# Patient Record
Sex: FEMALE | Race: WHITE | NOT HISPANIC OR LATINO
[De-identification: names, ages, dates, MRNs, and addresses within clinical notes are randomized per-mention and may not be internally consistent; named-entity substitution may affect disease eponyms.]

---

## 2021-12-29 PROBLEM — Z00.00 ENCOUNTER FOR PREVENTIVE HEALTH EXAMINATION: Status: ACTIVE | Noted: 2021-12-29

## 2022-01-21 ENCOUNTER — APPOINTMENT (OUTPATIENT)
Dept: GERIATRICS | Facility: CLINIC | Age: 87
End: 2022-01-21
Payer: MEDICARE

## 2022-01-21 ENCOUNTER — LABORATORY RESULT (OUTPATIENT)
Age: 87
End: 2022-01-21

## 2022-01-21 VITALS
HEART RATE: 94 BPM | WEIGHT: 106 LBS | DIASTOLIC BLOOD PRESSURE: 70 MMHG | SYSTOLIC BLOOD PRESSURE: 126 MMHG | OXYGEN SATURATION: 98 % | BODY MASS INDEX: 20.01 KG/M2 | TEMPERATURE: 97.8 F | HEIGHT: 61 IN

## 2022-01-21 DIAGNOSIS — R07.81 PLEURODYNIA: ICD-10-CM

## 2022-01-21 DIAGNOSIS — R00.0 TACHYCARDIA, UNSPECIFIED: ICD-10-CM

## 2022-01-21 PROCEDURE — 99205 OFFICE O/P NEW HI 60 MIN: CPT | Mod: 25

## 2022-01-21 PROCEDURE — 36415 COLL VENOUS BLD VENIPUNCTURE: CPT

## 2022-01-24 ENCOUNTER — TRANSCRIPTION ENCOUNTER (OUTPATIENT)
Age: 87
End: 2022-01-24

## 2022-01-24 NOTE — ASSESSMENT
[FreeTextEntry1] : to get records from her physicians\par goal is to build her home at [place]\par will continue with cardiology at [facility]\par memory testing in near future\par labs to look for reversible causes \par \par Son: Andrei Reynolds\par \par wrote letter with concerns to be read prior to visit\par \par weight loss\par depression\par anxiety

## 2022-01-24 NOTE — HISTORY OF PRESENT ILLNESS
[Cane] : cane [Carbon Monoxide Detector] : carbon monoxide detector [Any fall with injury in past year] : Patient reported fall with injury in the past year [Completely Independent] : Completely independent. [0] : 2) Feeling down, depressed, or hopeless: Not at all (0) [PHQ-2 Negative - No further assessment needed] : PHQ-2 Negative - No further assessment needed [Smoke Detector] : smoke detector [FreeTextEntry1] : PCP Dr Terry Fields - \par Now working with Dr. Lanette Lanier\par \par Had Laminectomy L2 L3 Aug 2021   11 days ago 2021 Heart Attack\par \par Prior to MI she was on a beta blocker\par Dr Aguilera - [facility]\par maternal grandmother with MI\par \par Sept fell and hit head \par saw neurologist Dr Eduardo Fields\par Brain and Neck MRI negative\par \par Dr Shah well known to patient\par has also noted changes in mentation/cognitive status\par \par Son presenting with mother\par concern over her memory, ability to care for finances\par especially worse after anesthesia of back surgery\par has seen a neurologist and had simple memory testing\par \par Son: Andrei Reynolds [Grab Bars] : no grab bars [Shower Chair] : no shower chair [[id number]] : 0

## 2022-01-24 NOTE — REVIEW OF SYSTEMS
[Fever] : no fever [Chills] : no chills [Eye Pain] : no eye pain [Red Eyes] : eyes not red [Nosebleeds] : no nosebleeds [Nasal Discharge] : no nasal discharge [Chest Pain] : no chest pain [Palpitations] : no palpitations [Cough] : no cough [SOB on Exertion] : no shortness of breath during exertion [Constipation] : no constipation [Diarrhea] : no diarrhea [Skin Lesions] : no skin lesions [Skin Wound] : no skin wound [Dizziness] : no dizziness [Fainting] : no fainting [Suicidal] : not suicidal [Sleep Disturbances] : no sleep disturbances

## 2022-01-24 NOTE — PHYSICAL EXAM
[Alert] : alert [Well Nourished] : well nourished [No Acute Distress] : in no acute distress [Well Developed] : well developed [Sclera] : the sclera and conjunctiva were normal [EOMI] : extraocular movements were intact [PERRL] : pupils were equal in size, round, and reactive to light [Normal Oral Mucosa] : normal oral mucosa [No Oral Pallor] : no oral pallor [No Respiratory Distress] : no respiratory distress [No Acc Muscle Use] : no accessory muscle use [Respiration, Rhythm And Depth] : normal respiratory rhythm and effort [Auscultation Breath Sounds / Voice Sounds] : lungs were clear to auscultation bilaterally [Normal S1, S2] : normal S1 and S2 [Heart Rate And Rhythm] : heart rate was normal and rhythm regular [Bowel Sounds] : normal bowel sounds [Abdomen Tenderness] : non-tender [Abdomen Soft] : soft [Cervical Lymph Nodes Enlarged Posterior Bilaterally] : posterior cervical [Cervical Lymph Nodes Enlarged Anterior Bilaterally] : anterior cervical, supraclavicular [No CVA Tenderness] : no CVA  tenderness [No Spinal Tenderness] : no spinal tenderness [Normal Color / Pigmentation] : normal skin color and pigmentation [] : no rash [Normal Turgor] : normal skin turgor [Sensation] : the sensory exam was normal to light touch and pinprick [No Focal Deficits] : no focal deficits [Motor Exam] : the motor exam was normal [Normal Affect] : the affect was normal [Normal Mood] : the mood was normal

## 2022-01-24 NOTE — HISTORY OF PRESENT ILLNESS
[Cane] : cane [Carbon Monoxide Detector] : carbon monoxide detector [Any fall with injury in past year] : Patient reported fall with injury in the past year [Completely Independent] : Completely independent. [0] : 2) Feeling down, depressed, or hopeless: Not at all (0) [PHQ-2 Negative - No further assessment needed] : PHQ-2 Negative - No further assessment needed [Smoke Detector] : smoke detector [FreeTextEntry1] : PCP Dr Terry Fields - \par Now working with Dr. Lanette Lanier\par \par Had Laminectomy L2 L3 Aug 2021   11 days ago 2021 Heart Attack\par \par Prior to MI she was on a beta blocker\par Dr Aguilera - [facility]\par maternal grandmother with MI\par \par Sept fell and hit head \par saw neurologist Dr Eduardo Fields\par Brain and Neck MRI negative\par \par Dr Shah well known to patient\par has also noted changes in mentation/cognitive status\par \par Son presenting with mother\par concern over her memory, ability to care for finances\par especially worse after anesthesia of back surgery\par has seen a neurologist and had simple memory testing\par \par Son: Andrei Reynolds [Grab Bars] : no grab bars [Shower Chair] : no shower chair [[id number]] : 0

## 2022-01-24 NOTE — ASSESSMENT
[FreeTextEntry1] : to get records from her physicians\par goal is to build her home at [place]\par will continue with cardiology at [facility]\par memory testing in near future\par labs to look for reversible causes \par \par Son: Andrei Reynolds\par \par wrote letter with concerns to be read prior to visit\par \par weight loss\par depression\par anxiety

## 2022-01-26 LAB
25(OH)D3 SERPL-MCNC: 49.2 NG/ML
ALBUMIN SERPL ELPH-MCNC: 4.4 G/DL
ALP BLD-CCNC: 81 U/L
ALT SERPL-CCNC: 20 U/L
ANION GAP SERPL CALC-SCNC: 13 MMOL/L
APPEARANCE: CLEAR
AST SERPL-CCNC: 24 U/L
BASOPHILS # BLD AUTO: 0.04 K/UL
BASOPHILS NFR BLD AUTO: 0.8 %
BILIRUB SERPL-MCNC: 0.4 MG/DL
BILIRUBIN URINE: NEGATIVE
BLOOD URINE: ABNORMAL
BUN SERPL-MCNC: 19 MG/DL
CALCIUM SERPL-MCNC: 9.3 MG/DL
CHLORIDE SERPL-SCNC: 104 MMOL/L
CHOLEST SERPL-MCNC: 162 MG/DL
CO2 SERPL-SCNC: 25 MMOL/L
COLOR: YELLOW
CREAT SERPL-MCNC: 0.84 MG/DL
EOSINOPHIL # BLD AUTO: 0.06 K/UL
EOSINOPHIL NFR BLD AUTO: 1.1 %
GLUCOSE QUALITATIVE U: NEGATIVE
GLUCOSE SERPL-MCNC: 90 MG/DL
HCT VFR BLD CALC: 37.6 %
HDLC SERPL-MCNC: 77 MG/DL
HGB BLD-MCNC: 12.1 G/DL
IMM GRANULOCYTES NFR BLD AUTO: 0.2 %
KETONES URINE: NEGATIVE
LDLC SERPL CALC-MCNC: 60 MG/DL
LEUKOCYTE ESTERASE URINE: ABNORMAL
LYMPHOCYTES # BLD AUTO: 0.55 K/UL
LYMPHOCYTES NFR BLD AUTO: 10.3 %
MAN DIFF?: NORMAL
MCHC RBC-ENTMCNC: 29.6 PG
MCHC RBC-ENTMCNC: 32.2 GM/DL
MCV RBC AUTO: 91.9 FL
MONOCYTES # BLD AUTO: 0.46 K/UL
MONOCYTES NFR BLD AUTO: 8.6 %
NEUTROPHILS # BLD AUTO: 4.2 K/UL
NEUTROPHILS NFR BLD AUTO: 79 %
NITRITE URINE: NEGATIVE
NONHDLC SERPL-MCNC: 85 MG/DL
PH URINE: 6
PLATELET # BLD AUTO: 243 K/UL
POTASSIUM SERPL-SCNC: 3.9 MMOL/L
PROT SERPL-MCNC: 7.3 G/DL
PROTEIN URINE: NORMAL
RBC # BLD: 4.09 M/UL
RBC # FLD: 14.6 %
SODIUM SERPL-SCNC: 142 MMOL/L
SPECIFIC GRAVITY URINE: 1.02
TRIGL SERPL-MCNC: 126 MG/DL
TSH SERPL-ACNC: 2.9 UIU/ML
UROBILINOGEN URINE: NORMAL
VIT B12 SERPL-MCNC: 524 PG/ML
WBC # FLD AUTO: 5.32 K/UL

## 2022-02-14 ENCOUNTER — APPOINTMENT (OUTPATIENT)
Dept: GERIATRICS | Facility: CLINIC | Age: 87
End: 2022-02-14
Payer: MEDICARE

## 2022-02-14 VITALS
OXYGEN SATURATION: 98 % | SYSTOLIC BLOOD PRESSURE: 123 MMHG | WEIGHT: 111 LBS | TEMPERATURE: 97.9 F | BODY MASS INDEX: 20.97 KG/M2 | DIASTOLIC BLOOD PRESSURE: 70 MMHG | HEART RATE: 82 BPM

## 2022-02-14 PROCEDURE — 99215 OFFICE O/P EST HI 40 MIN: CPT

## 2022-02-14 RX ORDER — METOPROLOL TARTRATE 25 MG/1
25 TABLET, FILM COATED ORAL
Refills: 0 | Status: COMPLETED | COMMUNITY
End: 2022-02-14

## 2022-02-14 NOTE — PHYSICAL EXAM
[Alert] : alert [Well Nourished] : well nourished [Well Developed] : well developed [Sclera] : the sclera and conjunctiva were normal [EOMI] : extraocular movements were intact [PERRL] : pupils were equal in size, round, and reactive to light [Normal Oral Mucosa] : normal oral mucosa [No Oral Pallor] : no oral pallor [No Respiratory Distress] : no respiratory distress [No Acc Muscle Use] : no accessory muscle use [Respiration, Rhythm And Depth] : normal respiratory rhythm and effort [Auscultation Breath Sounds / Voice Sounds] : lungs were clear to auscultation bilaterally [Normal S1, S2] : normal S1 and S2 [Heart Rate And Rhythm] : heart rate was normal and rhythm regular [Bowel Sounds] : normal bowel sounds [Abdomen Tenderness] : non-tender [Abdomen Soft] : soft [Cervical Lymph Nodes Enlarged Posterior Bilaterally] : posterior cervical [Cervical Lymph Nodes Enlarged Anterior Bilaterally] : anterior cervical, supraclavicular [No CVA Tenderness] : no CVA  tenderness [No Spinal Tenderness] : no spinal tenderness [Normal Color / Pigmentation] : normal skin color and pigmentation [] : no rash [Normal Turgor] : normal skin turgor [No Focal Deficits] : no focal deficits [Sensation] : the sensory exam was normal to light touch and pinprick [Motor Exam] : the motor exam was normal [Normal Affect] : the affect was normal [Normal Mood] : the mood was normal

## 2022-02-14 NOTE — REVIEW OF SYSTEMS
[Fever] : no fever [Chills] : no chills [Eye Pain] : no eye pain [Red Eyes] : eyes not red [Nosebleeds] : no nosebleeds [Nasal Discharge] : no nasal discharge [Chest Pain] : no chest pain [Palpitations] : no palpitations [Cough] : no cough [SOB on Exertion] : no shortness of breath during exertion [Constipation] : no constipation [Diarrhea] : no diarrhea [Skin Lesions] : no skin lesions [Skin Wound] : no skin wound [Dizziness] : no dizziness [Suicidal] : not suicidal [Fainting] : no fainting [Sleep Disturbances] : no sleep disturbances

## 2022-02-14 NOTE — ASSESSMENT
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

## 2022-02-14 NOTE — HISTORY OF PRESENT ILLNESS
[No falls in past year] : Patient reported no falls in the past year [Completely Independent] : Completely independent. [Cane] : cane [Smoke Detector] : smoke detector [Carbon Monoxide Detector] : carbon monoxide detector [Grab Bars] : grab bars [Shower Chair] : shower chair [0] : 2) Feeling down, depressed, or hopeless: Not at all (0) [PHQ-2 Negative - No further assessment needed] : PHQ-2 Negative - No further assessment needed [FreeTextEntry1] : PCP Dr Terry Fields - \par Now working with Dr. Lanette Lanier\par \par Had Laminectomy L2 L3 Aug 2021   11 days ago 2021 Heart Attack\par \par Prior to MI she was on a beta blocker\par Dr Aguilera - [facility]\par maternal grandmother with MI\par \par Sept fell and hit head \par saw neurologist Dr Eduardo Fields\par Brain and Neck MRI negative\par \par Dr Shah well known to patient\par has also noted changes in mentation/cognitive status\par \par Son presenting with mother\par concern over her memory, ability to care for finances\par especially worse after anesthesia of back surgery\par has seen a neurologist and had simple memory testing\par \par Son: Andrei Reynolds\par \par update 2/14/22\par due for PPM this week\par also stopped beta blocker\par on diltiazem PRN only\par feels much better \par son still concerned about memory loss [[id number]] : 0

## 2022-03-07 ENCOUNTER — LABORATORY RESULT (OUTPATIENT)
Age: 87
End: 2022-03-07

## 2022-03-09 ENCOUNTER — TRANSCRIPTION ENCOUNTER (OUTPATIENT)
Age: 87
End: 2022-03-09

## 2022-03-10 ENCOUNTER — TRANSCRIPTION ENCOUNTER (OUTPATIENT)
Age: 87
End: 2022-03-10

## 2022-03-17 ENCOUNTER — TRANSCRIPTION ENCOUNTER (OUTPATIENT)
Age: 87
End: 2022-03-17

## 2022-03-23 RX ORDER — ALPRAZOLAM 0.5 MG/1
0.5 TABLET, EXTENDED RELEASE ORAL
Qty: 30 | Refills: 0 | Status: DISCONTINUED | COMMUNITY
Start: 1900-01-01 | End: 2022-03-23

## 2022-04-18 ENCOUNTER — APPOINTMENT (OUTPATIENT)
Dept: GERIATRICS | Facility: CLINIC | Age: 87
End: 2022-04-18
Payer: MEDICARE

## 2022-04-18 VITALS
HEART RATE: 76 BPM | OXYGEN SATURATION: 98 % | BODY MASS INDEX: 20.39 KG/M2 | SYSTOLIC BLOOD PRESSURE: 114 MMHG | HEIGHT: 61 IN | WEIGHT: 108 LBS | TEMPERATURE: 98.1 F | DIASTOLIC BLOOD PRESSURE: 70 MMHG

## 2022-04-18 DIAGNOSIS — R00.1 BRADYCARDIA, UNSPECIFIED: ICD-10-CM

## 2022-04-18 PROCEDURE — 99214 OFFICE O/P EST MOD 30 MIN: CPT

## 2022-04-18 NOTE — HISTORY OF PRESENT ILLNESS
[Any fall with injury in past year] : Patient reported fall with injury in the past year [Completely Dependent] : Completely dependent. [Walker] : walker [0] : 2) Feeling down, depressed, or hopeless: Not at all (0) [PHQ-2 Negative - No further assessment needed] : PHQ-2 Negative - No further assessment needed [FreeTextEntry1] : PCP Dr Terry Fields - \par Now working with Dr. Lanette Lanier\par \par Had Laminectomy L2 L3 Aug 2021  \par 2021 Heart Attack\par New pacer - Dr Aguilera - [facility]\par \par Dr Shah well known to patient\par has also noted changes in mentation/cognitive status\par \par Son presenting with mother\par concern over her memory, ability to care for finances\par especially worse after anesthesia of back surgery\par has seen a neurologist and had simple memory testing\par \par Son: Andrei Reynolds\par \par update 4/18/22\par new PPM\par feelng better\par now working with psychologist Gregorio Choe and on sertraline and xanax\par seems improved\par also happier with new aide\par and open to more help\par recent fall now having PT \par  [[id number]] : 0

## 2022-04-18 NOTE — ASSESSMENT
[FreeTextEntry1] : s/p PPM\par patient feels better\par also working with therapist Gregorio Choe and on zoloft and xanax\par advised to move up xanax to dinner time\par might help with panic after aide leaves for the night\par \par \par next visit might attempt 1/2 tab xanax QHS\par \par Son: Andrei Reynolds\par \par wrote letter with concerns to be read prior to visit\par \par weight is improving\par depression is improving!

## 2022-04-19 ENCOUNTER — TRANSCRIPTION ENCOUNTER (OUTPATIENT)
Age: 87
End: 2022-04-19

## 2022-08-08 ENCOUNTER — TRANSCRIPTION ENCOUNTER (OUTPATIENT)
Age: 87
End: 2022-08-08

## 2022-08-11 ENCOUNTER — APPOINTMENT (OUTPATIENT)
Dept: GERIATRICS | Facility: CLINIC | Age: 87
End: 2022-08-11

## 2022-08-11 VITALS
BODY MASS INDEX: 21.73 KG/M2 | SYSTOLIC BLOOD PRESSURE: 124 MMHG | TEMPERATURE: 98 F | WEIGHT: 115 LBS | DIASTOLIC BLOOD PRESSURE: 70 MMHG | OXYGEN SATURATION: 98 % | HEART RATE: 75 BPM

## 2022-08-11 DIAGNOSIS — R60.9 EDEMA, UNSPECIFIED: ICD-10-CM

## 2022-08-11 PROCEDURE — 36415 COLL VENOUS BLD VENIPUNCTURE: CPT

## 2022-08-11 PROCEDURE — 99214 OFFICE O/P EST MOD 30 MIN: CPT | Mod: 25

## 2022-08-11 NOTE — PHYSICAL EXAM
[Alert] : alert [Well Nourished] : well nourished [Well Developed] : well developed [Sclera] : the sclera and conjunctiva were normal [EOMI] : extraocular movements were intact [PERRL] : pupils were equal in size, round, and reactive to light [Normal Oral Mucosa] : normal oral mucosa [No Oral Pallor] : no oral pallor [No Respiratory Distress] : no respiratory distress [No Acc Muscle Use] : no accessory muscle use [Respiration, Rhythm And Depth] : normal respiratory rhythm and effort [Auscultation Breath Sounds / Voice Sounds] : lungs were clear to auscultation bilaterally [Normal S1, S2] : normal S1 and S2 [Heart Rate And Rhythm] : heart rate was normal and rhythm regular [Bowel Sounds] : normal bowel sounds [Abdomen Tenderness] : non-tender [Abdomen Soft] : soft [Cervical Lymph Nodes Enlarged Posterior Bilaterally] : posterior cervical [Cervical Lymph Nodes Enlarged Anterior Bilaterally] : anterior cervical, supraclavicular [No CVA Tenderness] : no CVA  tenderness [No Spinal Tenderness] : no spinal tenderness [Normal Color / Pigmentation] : normal skin color and pigmentation [] : no rash [Normal Turgor] : normal skin turgor [Sensation] : the sensory exam was normal to light touch and pinprick [No Focal Deficits] : no focal deficits [Motor Exam] : the motor exam was normal [Normal Affect] : the affect was normal [Normal Mood] : the mood was normal

## 2022-08-11 NOTE — ASSESSMENT
[FreeTextEntry1] : check labs today\par labs drawn in office \par including K+\par she is now on lasix daily so will see if she needs supplement\par sertraline 37.5mg x 30 days\par then 50mg kumar x 30 days\par RTC 8 weeks

## 2022-08-11 NOTE — HISTORY OF PRESENT ILLNESS
[No falls in past year] : Patient reported no falls in the past year [Completely Independent] : Completely independent. [Cane] : cane [Smoke Detector] : smoke detector [Carbon Monoxide Detector] : carbon monoxide detector [Grab Bars] : grab bars [Shower Chair] : shower chair [0] : 2) Feeling down, depressed, or hopeless: Not at all (0) [FreeTextEntry1] : PCP Dr Terry Fields - \par Now working with Dr. Lanette Lanier\par \par Had Laminectomy L2 L3 Aug 2021  \par 2021 Heart Attack\par New pacer - Dr Aguilera - [facility]\par \par Dr Shah well known to patient\par has also noted changes in mentation/cognitive status\par \par Son presenting with mother\par concern over her memory, ability to care for finances\par especially worse after anesthesia of back surgery\par has seen a neurologist and had simple memory testing\par \par Son: Andrei Reynolds\par \par update 8/11/22\par now taking lasix daily\par at times has diarrhea so concerned about her potassium\par more anxiety\par issues with sleep per son\par  [PHQ-2 Negative - No further assessment needed] : PHQ-2 Negative - No further assessment needed [[id number]] : 0

## 2022-08-25 ENCOUNTER — TRANSCRIPTION ENCOUNTER (OUTPATIENT)
Age: 87
End: 2022-08-25

## 2022-08-25 LAB
ALBUMIN SERPL ELPH-MCNC: 4.3 G/DL
ALP BLD-CCNC: 80 U/L
ALT SERPL-CCNC: 16 U/L
ANION GAP SERPL CALC-SCNC: 12 MMOL/L
AST SERPL-CCNC: 24 U/L
BASOPHILS # BLD AUTO: 0.04 K/UL
BASOPHILS NFR BLD AUTO: 0.6 %
BILIRUB SERPL-MCNC: 0.3 MG/DL
BUN SERPL-MCNC: 18 MG/DL
CALCIUM SERPL-MCNC: 9.3 MG/DL
CHLORIDE SERPL-SCNC: 105 MMOL/L
CO2 SERPL-SCNC: 25 MMOL/L
CREAT SERPL-MCNC: 0.96 MG/DL
EGFR: 58 ML/MIN/1.73M2
EOSINOPHIL # BLD AUTO: 0.08 K/UL
EOSINOPHIL NFR BLD AUTO: 1.3 %
GLUCOSE SERPL-MCNC: 99 MG/DL
HCT VFR BLD CALC: 39.2 %
HGB BLD-MCNC: 12.7 G/DL
IMM GRANULOCYTES NFR BLD AUTO: 0.2 %
LYMPHOCYTES # BLD AUTO: 1.17 K/UL
LYMPHOCYTES NFR BLD AUTO: 18.7 %
MAN DIFF?: NORMAL
MCHC RBC-ENTMCNC: 29.5 PG
MCHC RBC-ENTMCNC: 32.4 GM/DL
MCV RBC AUTO: 91 FL
MONOCYTES # BLD AUTO: 0.4 K/UL
MONOCYTES NFR BLD AUTO: 6.4 %
NEUTROPHILS # BLD AUTO: 4.55 K/UL
NEUTROPHILS NFR BLD AUTO: 72.8 %
PLATELET # BLD AUTO: 227 K/UL
POTASSIUM SERPL-SCNC: 4.3 MMOL/L
PROT SERPL-MCNC: 7.4 G/DL
RBC # BLD: 4.31 M/UL
RBC # FLD: 14.4 %
SODIUM SERPL-SCNC: 142 MMOL/L
TSH SERPL-ACNC: 2.67 UIU/ML
WBC # FLD AUTO: 6.25 K/UL

## 2022-08-29 ENCOUNTER — APPOINTMENT (OUTPATIENT)
Dept: GERIATRICS | Facility: CLINIC | Age: 87
End: 2022-08-29

## 2022-08-31 ENCOUNTER — TRANSCRIPTION ENCOUNTER (OUTPATIENT)
Age: 87
End: 2022-08-31

## 2022-09-13 ENCOUNTER — TRANSCRIPTION ENCOUNTER (OUTPATIENT)
Age: 87
End: 2022-09-13

## 2022-09-29 ENCOUNTER — TRANSCRIPTION ENCOUNTER (OUTPATIENT)
Age: 87
End: 2022-09-29

## 2022-10-03 ENCOUNTER — TRANSCRIPTION ENCOUNTER (OUTPATIENT)
Age: 87
End: 2022-10-03

## 2022-10-25 ENCOUNTER — TRANSCRIPTION ENCOUNTER (OUTPATIENT)
Age: 87
End: 2022-10-25

## 2022-10-25 RX ORDER — FUROSEMIDE 20 MG/1
20 TABLET ORAL
Qty: 90 | Refills: 3 | Status: COMPLETED | COMMUNITY
End: 2022-10-25

## 2022-11-01 ENCOUNTER — TRANSCRIPTION ENCOUNTER (OUTPATIENT)
Age: 87
End: 2022-11-01

## 2022-11-10 ENCOUNTER — TRANSCRIPTION ENCOUNTER (OUTPATIENT)
Age: 87
End: 2022-11-10

## 2022-12-29 ENCOUNTER — LABORATORY RESULT (OUTPATIENT)
Age: 87
End: 2022-12-29

## 2022-12-29 ENCOUNTER — APPOINTMENT (OUTPATIENT)
Dept: GERIATRICS | Facility: CLINIC | Age: 87
End: 2022-12-29
Payer: MEDICARE

## 2022-12-29 VITALS
TEMPERATURE: 97.2 F | BODY MASS INDEX: 21.73 KG/M2 | HEART RATE: 79 BPM | DIASTOLIC BLOOD PRESSURE: 70 MMHG | SYSTOLIC BLOOD PRESSURE: 110 MMHG | OXYGEN SATURATION: 98 % | WEIGHT: 115 LBS

## 2022-12-29 PROCEDURE — 99215 OFFICE O/P EST HI 40 MIN: CPT

## 2022-12-29 NOTE — ASSESSMENT
[FreeTextEntry1] : check labs today\par labs drawn in office \par no longer on lasix\par no longer on diltizem \par goal is bottle of water 3 meals a day\par move zoloft to QHS\par benzo at 9PM\par veggies at lunch\par minimize immodium\par \par may need to increase sertraline

## 2022-12-29 NOTE — SOCIAL HISTORY
Epidural Procedure Note    Staff:     Anesthesiologist:  BRISA CORBETT  Location: OB   Pre-procedure checklist:   patient identified, IV checked, site marked, risks and benefits discussed, informed consent, monitors and equipment checked, pre-op evaluation and at physician/surgeon's request      Correct Patient: Yes      Correct Position: Yes      Correct Site: Yes      Correct Procedure: Yes      Correct Laterality:  N/A    Site Marked:  N/A  Procedure:     Procedure:  Epidural catheter    ASA:  2    Position:  Sitting    Sterile Prep: chloraprep      Insertion site:  L3-4    Local skin infiltration:  1% lidocaine    amount (mL):  2    Approach:  Midline    Needle gauge (G):  17    Needle Length (in):  3.5    Block Needle Type:  Touhy    Injection Technique:  LORT saline    Attempts:  1    Redirects:  0    Catheter gauge (G):  19    Catheter threaded easily: Yes      Threaded to cm at skin:  9    Paresthesias:  No    Aspiration negative for Heme or CSF: Yes      Test dose (mL):  3    Test dose negative for signs of intravascular, subdural or intrathecal injection: Yes    Assessment/Narrative:      Pt had little to no relief from initial epidural, elected to replace.  Uncomplicated re-insertion with good relief.           [With caregiver] : lives with caregiver

## 2022-12-29 NOTE — HISTORY OF PRESENT ILLNESS
[Any fall with injury in past year] : Patient reported fall with injury in the past year [Completely Independent] : Completely independent. [Cane] : cane [Walker] : walker [Smoke Detector] : smoke detector [Carbon Monoxide Detector] : carbon monoxide detector [Grab Bars] : grab bars [Shower Chair] : shower chair [0] : 2) Feeling down, depressed, or hopeless: Not at all (0) [FreeTextEntry1] : with Carmen [facility]HA 9 hours MTW 5 hours \par More falls recently\par at times falling backwards\par no longer on lasix or diltiazem\par \par \par  [de-identified] : frac ribs, bruises [PHQ-2 Negative - No further assessment needed] : PHQ-2 Negative - No further assessment needed [[id number]] : 0

## 2023-01-05 ENCOUNTER — TRANSCRIPTION ENCOUNTER (OUTPATIENT)
Age: 88
End: 2023-01-05

## 2023-01-05 LAB
25(OH)D3 SERPL-MCNC: 47 NG/ML
ALBUMIN SERPL ELPH-MCNC: 4.4 G/DL
ALP BLD-CCNC: 72 U/L
ALT SERPL-CCNC: 12 U/L
ANION GAP SERPL CALC-SCNC: 12 MMOL/L
APPEARANCE: CLEAR
AST SERPL-CCNC: 22 U/L
BASOPHILS # BLD AUTO: 0.03 K/UL
BASOPHILS NFR BLD AUTO: 0.6 %
BILIRUB SERPL-MCNC: 0.4 MG/DL
BILIRUBIN URINE: NEGATIVE
BLOOD URINE: ABNORMAL
BUN SERPL-MCNC: 13 MG/DL
CALCIUM SERPL-MCNC: 9.2 MG/DL
CHLORIDE SERPL-SCNC: 103 MMOL/L
CHOLEST SERPL-MCNC: 185 MG/DL
CO2 SERPL-SCNC: 26 MMOL/L
COLOR: YELLOW
CREAT SERPL-MCNC: 0.75 MG/DL
EGFR: 78 ML/MIN/1.73M2
EOSINOPHIL # BLD AUTO: 0.05 K/UL
EOSINOPHIL NFR BLD AUTO: 1 %
GLUCOSE QUALITATIVE U: NEGATIVE
GLUCOSE SERPL-MCNC: 85 MG/DL
HCT VFR BLD CALC: 42.3 %
HDLC SERPL-MCNC: 83 MG/DL
HGB BLD-MCNC: 13.5 G/DL
IMM GRANULOCYTES NFR BLD AUTO: 0.4 %
KETONES URINE: NEGATIVE
LDLC SERPL CALC-MCNC: 83 MG/DL
LEUKOCYTE ESTERASE URINE: NEGATIVE
LYMPHOCYTES # BLD AUTO: 1.01 K/UL
LYMPHOCYTES NFR BLD AUTO: 21.1 %
MAN DIFF?: NORMAL
MCHC RBC-ENTMCNC: 30.1 PG
MCHC RBC-ENTMCNC: 31.9 GM/DL
MCV RBC AUTO: 94.4 FL
MONOCYTES # BLD AUTO: 0.37 K/UL
MONOCYTES NFR BLD AUTO: 7.7 %
NEUTROPHILS # BLD AUTO: 3.31 K/UL
NEUTROPHILS NFR BLD AUTO: 69.2 %
NITRITE URINE: POSITIVE
NONHDLC SERPL-MCNC: 102 MG/DL
PH URINE: 6
PLATELET # BLD AUTO: 249 K/UL
POTASSIUM SERPL-SCNC: 4.2 MMOL/L
PROT SERPL-MCNC: 7.3 G/DL
PROTEIN URINE: NEGATIVE
RBC # BLD: 4.48 M/UL
RBC # FLD: 13.8 %
SODIUM SERPL-SCNC: 141 MMOL/L
SPECIFIC GRAVITY URINE: 1.02
TRIGL SERPL-MCNC: 91 MG/DL
TSH SERPL-ACNC: 2.16 UIU/ML
UROBILINOGEN URINE: NORMAL
VIT B12 SERPL-MCNC: 755 PG/ML
WBC # FLD AUTO: 4.79 K/UL

## 2023-01-24 ENCOUNTER — TRANSCRIPTION ENCOUNTER (OUTPATIENT)
Age: 88
End: 2023-01-24

## 2023-01-26 ENCOUNTER — APPOINTMENT (OUTPATIENT)
Dept: GERIATRICS | Facility: CLINIC | Age: 88
End: 2023-01-26
Payer: MEDICARE

## 2023-01-26 VITALS
OXYGEN SATURATION: 97 % | BODY MASS INDEX: 21.43 KG/M2 | SYSTOLIC BLOOD PRESSURE: 140 MMHG | WEIGHT: 113.4 LBS | HEART RATE: 88 BPM | TEMPERATURE: 98.9 F | DIASTOLIC BLOOD PRESSURE: 90 MMHG

## 2023-01-26 PROCEDURE — 99215 OFFICE O/P EST HI 40 MIN: CPT

## 2023-01-26 NOTE — ASSESSMENT
[FreeTextEntry1] : MOCA done today 25/30 so MCI\par suspect worsening anxiety could be relatd to UTI\par \par will check urine today\par urine collected in office today\par to assure no residual infection\par \par will increase sertraline to 75mg daily\par same benzo\par \par if no improvement can increase in 8 weeks\par to see cardiology tomorrow  - apparently some worsening LE edema\par \par spoke w son Andrei re results as well

## 2023-01-26 NOTE — HISTORY OF PRESENT ILLNESS
[Completely Dependent] : Completely dependent. [Cane] : cane [Walker] : walker [Smoke Detector] : smoke detector [Carbon Monoxide Detector] : carbon monoxide detector [0] : 2) Feeling down, depressed, or hopeless: Not at all (0) [One fall no injury in past year] : Patient reported one fall in the past year without injury [PHQ-2 Negative - No further assessment needed] : PHQ-2 Negative - No further assessment needed [FreeTextEntry1] : with Carmen [facility]HA 9 hours MTW 5 hours \par Here for memory testing\par since recent UTI\par failed bactrim but responded to macrobid\par thinks she has a sulfa alelrgy - rash but forgot to tell  me\par since UTI more anxiety\par asking about higher dose zoloft\par \par \par \par  [[id number]] : 0

## 2023-02-02 LAB
APPEARANCE: CLEAR
BILIRUBIN URINE: NEGATIVE
BLOOD URINE: NEGATIVE
COLOR: YELLOW
GLUCOSE QUALITATIVE U: NEGATIVE
KETONES URINE: NORMAL
LEUKOCYTE ESTERASE URINE: NEGATIVE
NITRITE URINE: NEGATIVE
PH URINE: 7
PROTEIN URINE: NORMAL
SPECIFIC GRAVITY URINE: 1.02
UROBILINOGEN URINE: NORMAL

## 2023-02-15 ENCOUNTER — TRANSCRIPTION ENCOUNTER (OUTPATIENT)
Age: 88
End: 2023-02-15

## 2023-02-16 ENCOUNTER — TRANSCRIPTION ENCOUNTER (OUTPATIENT)
Age: 88
End: 2023-02-16

## 2023-02-21 LAB
APPEARANCE: CLEAR
BILIRUBIN URINE: NEGATIVE
BLOOD URINE: NEGATIVE
COLOR: YELLOW
GLUCOSE QUALITATIVE U: NEGATIVE
KETONES URINE: NEGATIVE
LEUKOCYTE ESTERASE URINE: NEGATIVE
NITRITE URINE: NEGATIVE
PH URINE: 6
PROTEIN URINE: NORMAL
SPECIFIC GRAVITY URINE: 1.01
UROBILINOGEN URINE: NORMAL

## 2023-03-14 DIAGNOSIS — N95.0 POSTMENOPAUSAL BLEEDING: ICD-10-CM

## 2023-03-15 ENCOUNTER — APPOINTMENT (OUTPATIENT)
Dept: NEUROLOGY | Facility: CLINIC | Age: 88
End: 2023-03-15
Payer: MEDICARE

## 2023-03-15 VITALS
WEIGHT: 114 LBS | HEIGHT: 61 IN | DIASTOLIC BLOOD PRESSURE: 78 MMHG | SYSTOLIC BLOOD PRESSURE: 130 MMHG | OXYGEN SATURATION: 97 % | TEMPERATURE: 98.6 F | HEART RATE: 76 BPM | BODY MASS INDEX: 21.52 KG/M2

## 2023-03-15 DIAGNOSIS — Z78.9 OTHER SPECIFIED HEALTH STATUS: ICD-10-CM

## 2023-03-15 DIAGNOSIS — R13.10 DYSPHAGIA, UNSPECIFIED: ICD-10-CM

## 2023-03-15 PROCEDURE — 99205 OFFICE O/P NEW HI 60 MIN: CPT
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
## 2023-03-15 NOTE — CONSULT LETTER
[Dear  ___] : Dear  [unfilled], [Consult Letter:] : I had the pleasure of evaluating your patient, [unfilled]. [Please see my note below.] : Please see my note below. [FreeTextEntry3] : Sincerely,\par \par Thomas Molina M.D.\par

## 2023-03-15 NOTE — ASSESSMENT
[FreeTextEntry1] : I am concerned about her cognition.\par Will get neuropsychological evaluation\par CT head non-contrast\par Consider PET scan\par \par Modified barium swallow \par \par Stop Allegra and Imodium (patient self medicates).\par \par She is taking alprazolam every night - this can potentially worsen cognition.\par Could consider Seroquel instead.\par \par Discussed need to stay physically, mentally, and socially active.\par \par Further recommendations based on results.\par

## 2023-03-19 ENCOUNTER — RESULT REVIEW (OUTPATIENT)
Age: 88
End: 2023-03-19

## 2023-03-21 ENCOUNTER — RESULT REVIEW (OUTPATIENT)
Age: 88
End: 2023-03-21

## 2023-03-24 LAB
APPEARANCE: CLEAR
BILIRUBIN URINE: NEGATIVE
BLOOD URINE: NEGATIVE
COLOR: COLORLESS
GLUCOSE QUALITATIVE U: NEGATIVE
KETONES URINE: NEGATIVE
LEUKOCYTE ESTERASE URINE: NEGATIVE
NITRITE URINE: NEGATIVE
PH URINE: 7.5
PROTEIN URINE: NEGATIVE
SPECIFIC GRAVITY URINE: 1
UROBILINOGEN URINE: NORMAL

## 2023-03-27 ENCOUNTER — RESULT REVIEW (OUTPATIENT)
Age: 88
End: 2023-03-27

## 2023-03-27 ENCOUNTER — APPOINTMENT (OUTPATIENT)
Dept: OBGYN | Facility: CLINIC | Age: 88
End: 2023-03-27

## 2023-03-28 ENCOUNTER — TRANSCRIPTION ENCOUNTER (OUTPATIENT)
Age: 88
End: 2023-03-28

## 2023-03-28 ENCOUNTER — APPOINTMENT (OUTPATIENT)
Dept: UROLOGY | Facility: CLINIC | Age: 88
End: 2023-03-28

## 2023-03-29 ENCOUNTER — TRANSCRIPTION ENCOUNTER (OUTPATIENT)
Age: 88
End: 2023-03-29

## 2023-04-03 ENCOUNTER — TRANSCRIPTION ENCOUNTER (OUTPATIENT)
Age: 88
End: 2023-04-03

## 2023-04-03 ENCOUNTER — APPOINTMENT (OUTPATIENT)
Dept: GERIATRICS | Facility: CLINIC | Age: 88
End: 2023-04-03
Payer: MEDICARE

## 2023-04-03 VITALS
HEART RATE: 85 BPM | OXYGEN SATURATION: 98 % | WEIGHT: 115 LBS | HEIGHT: 61 IN | DIASTOLIC BLOOD PRESSURE: 76 MMHG | TEMPERATURE: 96.5 F | BODY MASS INDEX: 21.71 KG/M2 | SYSTOLIC BLOOD PRESSURE: 130 MMHG

## 2023-04-03 DIAGNOSIS — G47.00 INSOMNIA, UNSPECIFIED: ICD-10-CM

## 2023-04-03 PROCEDURE — 99214 OFFICE O/P EST MOD 30 MIN: CPT

## 2023-04-03 RX ORDER — ALPRAZOLAM 0.5 MG/1
0.5 TABLET ORAL
Qty: 30 | Refills: 0 | Status: COMPLETED | COMMUNITY
Start: 2022-04-18 | End: 2023-04-03

## 2023-04-06 ENCOUNTER — TRANSCRIPTION ENCOUNTER (OUTPATIENT)
Age: 88
End: 2023-04-06

## 2023-04-06 ENCOUNTER — NON-APPOINTMENT (OUTPATIENT)
Age: 88
End: 2023-04-06

## 2023-04-06 ENCOUNTER — OFFICE (OUTPATIENT)
Dept: URBAN - METROPOLITAN AREA CLINIC 86 | Facility: CLINIC | Age: 88
Setting detail: OPHTHALMOLOGY
End: 2023-04-06
Payer: MEDICARE

## 2023-04-06 DIAGNOSIS — H01.001: ICD-10-CM

## 2023-04-06 DIAGNOSIS — H02.052: ICD-10-CM

## 2023-04-06 DIAGNOSIS — H40.053: ICD-10-CM

## 2023-04-06 DIAGNOSIS — H25.13: ICD-10-CM

## 2023-04-06 DIAGNOSIS — H01.004: ICD-10-CM

## 2023-04-06 PROCEDURE — 99214 OFFICE O/P EST MOD 30 MIN: CPT | Performed by: OPHTHALMOLOGY

## 2023-04-06 PROCEDURE — 67820 REVISE EYELASHES: CPT | Performed by: OPHTHALMOLOGY

## 2023-04-06 ASSESSMENT — LID EXAM ASSESSMENTS
OD_MEIBOMITIS: RUL 3+
OD_TRICHIASIS: RUL 1+ 2+
OD_BLEPHARITIS: RUL 1+ 2+
OS_BLEPHARITIS: LUL 1+ 2+
OS_MEIBOMITIS: LUL 3+

## 2023-04-06 ASSESSMENT — LID POSITION - PTOSIS
OD_PTOSIS: RUL 2+
OS_PTOSIS: LUL 2+

## 2023-04-06 ASSESSMENT — CORNEAL SURGICAL SCARRING
OD_SCARRING: STROMAL
OS_SCARRING: STROMAL

## 2023-04-06 ASSESSMENT — CONFRONTATIONAL VISUAL FIELD TEST (CVF)
OS_FINDINGS: FULL
OD_FINDINGS: FULL

## 2023-04-07 ASSESSMENT — REFRACTION_AUTOREFRACTION
OD_CYLINDER: +2.25
OS_CYLINDER: +2.00
OD_SPHERE: -0.25
OS_SPHERE: +1.25
OD_AXIS: 160
OS_AXIS: 20

## 2023-04-07 ASSESSMENT — VISUAL ACUITY
OS_BCVA: 20/60-2
OD_BCVA: 20/50-2

## 2023-04-07 ASSESSMENT — SPHEQUIV_DERIVED
OS_SPHEQUIV: 2.25
OD_SPHEQUIV: 0.875

## 2023-04-12 ENCOUNTER — TRANSCRIPTION ENCOUNTER (OUTPATIENT)
Age: 88
End: 2023-04-12

## 2023-04-12 ENCOUNTER — APPOINTMENT (OUTPATIENT)
Dept: OBGYN | Facility: CLINIC | Age: 88
End: 2023-04-12
Payer: MEDICARE

## 2023-04-12 ENCOUNTER — NON-APPOINTMENT (OUTPATIENT)
Age: 88
End: 2023-04-12

## 2023-04-12 VITALS
WEIGHT: 115 LBS | HEIGHT: 61 IN | DIASTOLIC BLOOD PRESSURE: 76 MMHG | SYSTOLIC BLOOD PRESSURE: 114 MMHG | BODY MASS INDEX: 21.71 KG/M2

## 2023-04-12 DIAGNOSIS — Z12.31 ENCOUNTER FOR SCREENING MAMMOGRAM FOR MALIGNANT NEOPLASM OF BREAST: ICD-10-CM

## 2023-04-12 DIAGNOSIS — N95.0 POSTMENOPAUSAL BLEEDING: ICD-10-CM

## 2023-04-12 DIAGNOSIS — Z80.3 FAMILY HISTORY OF MALIGNANT NEOPLASM OF BREAST: ICD-10-CM

## 2023-04-12 DIAGNOSIS — R92.2 INCONCLUSIVE MAMMOGRAM: ICD-10-CM

## 2023-04-12 DIAGNOSIS — Z85.3 PERSONAL HISTORY OF MALIGNANT NEOPLASM OF BREAST: ICD-10-CM

## 2023-04-12 DIAGNOSIS — R93.89 ABNORMAL FINDINGS ON DIAGNOSTIC IMAGING OF OTHER SPECIFIED BODY STRUCTURES: ICD-10-CM

## 2023-04-12 DIAGNOSIS — Z80.0 FAMILY HISTORY OF MALIGNANT NEOPLASM OF DIGESTIVE ORGANS: ICD-10-CM

## 2023-04-12 DIAGNOSIS — Z11.51 ENCOUNTER FOR SCREENING FOR HUMAN PAPILLOMAVIRUS (HPV): ICD-10-CM

## 2023-04-12 PROCEDURE — 99204 OFFICE O/P NEW MOD 45 MIN: CPT

## 2023-04-13 ENCOUNTER — TRANSCRIPTION ENCOUNTER (OUTPATIENT)
Age: 88
End: 2023-04-13

## 2023-04-16 ENCOUNTER — FORM ENCOUNTER (OUTPATIENT)
Age: 88
End: 2023-04-16

## 2023-04-16 PROBLEM — G47.00 INSOMNIA: Status: ACTIVE | Noted: 2022-01-21

## 2023-04-16 NOTE — HISTORY OF PRESENT ILLNESS
[Smoke Detector] : smoke detector [1] : 2) Feeling down, depressed, or hopeless for several days (1) [PHQ-2 Negative - No further assessment needed] : PHQ-2 Negative - No further assessment needed [FreeTextEntry1] : with Kourtney [facility]HA \par sister Aaliyah is also present\par \par talking more about depression and anxiety\par neurologist had considered quetiapene\par son Andrei is very involved in care and has messaging in portal\par goal is to remain as positive as possible\par was not able to see GYN\par \par per HHA kourtney more overt decline in functional status and memory\par \par \par \par  [[id number]] : 2

## 2023-04-16 NOTE — REASON FOR VISIT
[Follow-Up] : a follow-up visit [Formal Caregiver] : formal caregiver [Family Member] : family member [FreeTextEntry3] : jessica Morales, Sister Aaliyah

## 2023-04-16 NOTE — ASSESSMENT
[FreeTextEntry1] : goals of care are more symptom based at this time\par goal is to allow for good sleep and better control of anxiety\par will trial higher dose of sertraline\par but if ineffective or no noticeable change may need to consider seroquel\par goal is likely to get her off of clonazepam, but slowly \par \par RTC 6-8 weeks to evaluate sertraline dosing\par will need to repeat Na level\par can follow  HB/UA if needed at that time as well\par await new appointment with GYN\par \par son Andrei involved in care\par met sister Aaliyah today\par HHA Carmen \par \par next steps\par cardiology medications are not staying updated in chart\par unclear if other specialities are removing medications \par need to follow up and have more recent chart note\par and medication review with son next visit, even if done via portal

## 2023-04-19 ENCOUNTER — OFFICE (OUTPATIENT)
Dept: URBAN - METROPOLITAN AREA CLINIC 86 | Facility: CLINIC | Age: 88
Setting detail: OPHTHALMOLOGY
End: 2023-04-19
Payer: MEDICARE

## 2023-04-19 DIAGNOSIS — H40.053: ICD-10-CM

## 2023-04-19 DIAGNOSIS — H02.403: ICD-10-CM

## 2023-04-19 PROBLEM — H02.89 MEIBOMIAN GLAND DYSFUNCTION ; BOTH EYES: Status: ACTIVE | Noted: 2023-04-06

## 2023-04-19 PROBLEM — H57.89 OTHER SPECIFIED DISORDERS OF EYE AND ADNEXA: Status: ACTIVE | Noted: 2023-04-06

## 2023-04-19 PROBLEM — H25.13 CATARACT SENILE NUCLEAR SCLEROSIS; BOTH EYES: Status: ACTIVE | Noted: 2023-04-06

## 2023-04-19 PROBLEM — H02.052: Status: RESOLVED | Noted: 2023-04-06 | Resolved: 2023-04-19

## 2023-04-19 PROBLEM — G23.1: Status: ACTIVE | Noted: 2023-04-06

## 2023-04-19 PROBLEM — H17.9 CORNEAL SCAR ; BOTH EYES: Status: ACTIVE | Noted: 2023-04-06

## 2023-04-19 PROBLEM — H01.001 BLEPHARITIS; RIGHT UPPER LID, LEFT UPPER LID: Status: ACTIVE | Noted: 2023-04-06

## 2023-04-19 PROBLEM — H01.004 BLEPHARITIS; RIGHT UPPER LID, LEFT UPPER LID: Status: ACTIVE | Noted: 2023-04-06

## 2023-04-19 PROCEDURE — 99213 OFFICE O/P EST LOW 20 MIN: CPT | Performed by: OPHTHALMOLOGY

## 2023-04-19 ASSESSMENT — LID EXAM ASSESSMENTS
OD_MRD1: 0 MM
OD_BLEPHARITIS: RUL 1+
OS_MRD1: 1 MM
OD_COMMENTS: PTOSIS
OS_COMMENTS: PTOSIS
OS_BLEPHARITIS: LUL 1+

## 2023-04-19 ASSESSMENT — CORNEAL SURGICAL SCARRING
OD_SCARRING: STROMAL
OS_SCARRING: STROMAL

## 2023-04-19 ASSESSMENT — VISUAL ACUITY
OD_BCVA: 20/50-2
OS_BCVA: 20/60

## 2023-04-19 ASSESSMENT — LID POSITION - PTOSIS
OD_PTOSIS: RUL 2+
OS_PTOSIS: LUL 2+

## 2023-05-03 LAB
CYTOLOGY CVX/VAG DOC THIN PREP: ABNORMAL
HPV HIGH+LOW RISK DNA PNL CVX: NOT DETECTED

## 2023-05-17 ENCOUNTER — APPOINTMENT (OUTPATIENT)
Dept: GERIATRICS | Facility: CLINIC | Age: 88
End: 2023-05-17
Payer: MEDICARE

## 2023-05-17 VITALS
SYSTOLIC BLOOD PRESSURE: 112 MMHG | OXYGEN SATURATION: 97 % | BODY MASS INDEX: 20.77 KG/M2 | HEIGHT: 61 IN | HEART RATE: 85 BPM | DIASTOLIC BLOOD PRESSURE: 84 MMHG | TEMPERATURE: 97.5 F | WEIGHT: 110 LBS

## 2023-05-17 PROCEDURE — 99214 OFFICE O/P EST MOD 30 MIN: CPT

## 2023-05-25 ENCOUNTER — TRANSCRIPTION ENCOUNTER (OUTPATIENT)
Age: 88
End: 2023-05-25

## 2023-06-06 ENCOUNTER — TRANSCRIPTION ENCOUNTER (OUTPATIENT)
Age: 88
End: 2023-06-06

## 2023-06-06 NOTE — HISTORY OF PRESENT ILLNESS
[FreeTextEntry1] : presenting with son and friend (HHA)\par truncated visit due to my own office scheduling\par note from son Andrei reviewed\par \par recent diagnosis from Dr. Natividad Vanessa (ophthalmologist) PSP - progressive supranuclear palsy\par this has been discussed with neurology as well \par might have combination of brain disease - to be investigated\par last  month did have several falls - "backwards falls" - ?retropulsion\par also more issues opening/tilting eyes per son\par voice and tone have worsened\par word finding difficulty, slowness in thoughts\par worsening changes in judgment insight and problem solving \par \par will require more help in the home\par 24 hour help to get HHA to prevent these falls\par medicaid forms to be completed today [Any fall with injury in past year] : Patient reported fall with injury in the past year [Completely Dependent] : Completely dependent. [Walker] : walker [Smoke Detector] : smoke detector [1] : 2) Feeling down, depressed, or hopeless for several days (1) [PHQ-2 Negative - No further assessment needed] : PHQ-2 Negative - No further assessment needed [[id number]] : 2

## 2023-06-06 NOTE — ASSESSMENT
[FreeTextEntry1] : new diagnosis PSP\par already high risk for falls\par several falls last month\par needs constant supervision to prevent retropulsion\par she will requre help with her upright movements to assist and compensate for her axial rigidity\par cognitive impairment and anxiety are also contributing \par continue zoloft\par seems to be helping\par patient not to be told about PSP dx\par \par \par son Andrei involved in care\par sister Aaliyah \par HHA Carmen \par \par

## 2023-06-08 ENCOUNTER — APPOINTMENT (OUTPATIENT)
Dept: GERIATRICS | Facility: CLINIC | Age: 88
End: 2023-06-08
Payer: MEDICARE

## 2023-06-08 DIAGNOSIS — J18.9 PNEUMONIA, UNSPECIFIED ORGANISM: ICD-10-CM

## 2023-06-08 PROCEDURE — 99442: CPT | Mod: 95

## 2023-06-08 NOTE — REASON FOR VISIT
[Home] : at home, [unfilled] , at the time of the visit. [Medical Office: ([facility])___] : at the medical office located in  [Acute] : an acute visit [Family Member] : family member [FreeTextEntry3] : rex Forbes

## 2023-06-08 NOTE — ASSESSMENT
[FreeTextEntry1] : on levaquin but son will confirm dose and frequency\par medicaid application in process \par new diagnosis PSP\par already high risk for falls\par several falls last month\par needs constant supervision to prevent retropulsion\par she will require help with her upright movements to assist and compensate for her axial rigidity\par cognitive impairment and anxiety are also contributing \par now plan is palliative for symptom control only for post menopausal bleeding\par very anxious about any testing that may need to be done\par will not pursue at this time as she remains asymptomatic\par \par need more recent cardiology - Dr Aguilera notes in our office \par \par rex Forbes very involved in care\par issues with fax machine that sometimes consult notes are not received\par He always ensures that the information is delivered\par \par \par \par

## 2023-06-08 NOTE — HISTORY OF PRESENT ILLNESS
[Any fall with injury in past year] : Patient reported fall with injury in the past year [Completely Dependent] : Completely dependent. [Smoke Detector] : smoke detector [0] : 2) Feeling down, depressed, or hopeless: Not at all (0) [PHQ-2 Negative - No further assessment needed] : PHQ-2 Negative - No further assessment needed [FreeTextEntry1] : patient with more SOB\par found to have pneumonia and on 10 day course of levaquin\par high risk for falls given underlying PSP\par  [[id number]] : 0

## 2023-06-20 ENCOUNTER — TRANSCRIPTION ENCOUNTER (OUTPATIENT)
Age: 88
End: 2023-06-20

## 2023-06-26 ENCOUNTER — TRANSCRIPTION ENCOUNTER (OUTPATIENT)
Age: 88
End: 2023-06-26

## 2023-06-27 ASSESSMENT — REFRACTION_AUTOREFRACTION
OS_CYLINDER: +2.00
OS_AXIS: 20
OD_AXIS: 160
OD_CYLINDER: +2.25
OS_SPHERE: +1.25
OD_SPHERE: -0.25

## 2023-06-27 ASSESSMENT — SPHEQUIV_DERIVED
OS_SPHEQUIV: 2.25
OD_SPHEQUIV: 0.875

## 2023-06-28 ENCOUNTER — APPOINTMENT (OUTPATIENT)
Dept: GASTROENTEROLOGY | Facility: CLINIC | Age: 88
End: 2023-06-28
Payer: MEDICARE

## 2023-06-28 ENCOUNTER — LABORATORY RESULT (OUTPATIENT)
Age: 88
End: 2023-06-28

## 2023-06-28 ENCOUNTER — APPOINTMENT (OUTPATIENT)
Dept: GERIATRICS | Facility: CLINIC | Age: 88
End: 2023-06-28
Payer: MEDICARE

## 2023-06-28 VITALS
HEART RATE: 75 BPM | SYSTOLIC BLOOD PRESSURE: 126 MMHG | DIASTOLIC BLOOD PRESSURE: 82 MMHG | WEIGHT: 111 LBS | OXYGEN SATURATION: 97 % | HEIGHT: 61 IN | BODY MASS INDEX: 20.96 KG/M2

## 2023-06-28 VITALS
SYSTOLIC BLOOD PRESSURE: 110 MMHG | HEIGHT: 61 IN | DIASTOLIC BLOOD PRESSURE: 80 MMHG | WEIGHT: 110 LBS | BODY MASS INDEX: 20.77 KG/M2

## 2023-06-28 DIAGNOSIS — H02.401 UNSPECIFIED PTOSIS OF RIGHT EYELID: ICD-10-CM

## 2023-06-28 DIAGNOSIS — R19.4 CHANGE IN BOWEL HABIT: ICD-10-CM

## 2023-06-28 PROCEDURE — 99204 OFFICE O/P NEW MOD 45 MIN: CPT

## 2023-06-28 PROCEDURE — 36415 COLL VENOUS BLD VENIPUNCTURE: CPT

## 2023-06-28 PROCEDURE — 99214 OFFICE O/P EST MOD 30 MIN: CPT | Mod: 25

## 2023-06-28 PROCEDURE — 93000 ELECTROCARDIOGRAM COMPLETE: CPT

## 2023-06-28 RX ORDER — FEXOFENADINE HYDROCHLORIDE 180 MG/1
TABLET, FILM COATED ORAL
Refills: 0 | Status: ACTIVE | COMMUNITY

## 2023-06-28 NOTE — HISTORY OF PRESENT ILLNESS
[FreeTextEntry1] : Presents with a concern re: " diarrhea " with fruits / vegetables . Food / stool diary provided by aide / son. Reports occasional loose stools  ( not associated necessarily with fruits / veggies) . No urgency / tenesmus / BRBPR / nocturnal stools / incontinence. Takes Imodium ~  2-3 times weekly before ging out. Takes prophylacticlly

## 2023-06-28 NOTE — PHYSICAL EXAM
[Alert] : alert [Sclera] : the sclera and conjunctiva were normal [Hearing Threshold Finger Rub Not [place]] : hearing was normal [No Respiratory Distress] : no respiratory distress [Heart Rate And Rhythm] : heart rate was normal and rhythm regular [None] : no edema [Bowel Sounds] : normal bowel sounds [Skin Lesions] : no skin lesions [No Focal Deficits] : no focal deficits [Oriented To Time, Place, And Person] : oriented to person, place, and time [de-identified] : ambulates with walker

## 2023-06-28 NOTE — ASSESSMENT
[FreeTextEntry1] : Encouraged patient to liberalize diet as there is no evidence based on food / stool diarrhea of fiber induced diarrhea. Follow up as needed\par \par Pertinent available records reviewed\par

## 2023-06-30 ENCOUNTER — NON-APPOINTMENT (OUTPATIENT)
Age: 88
End: 2023-06-30

## 2023-06-30 PROBLEM — H02.401 PTOSIS OF RIGHT UPPER EYELID: Status: ACTIVE | Noted: 2023-06-28

## 2023-06-30 LAB
ALBUMIN SERPL ELPH-MCNC: 4.6 G/DL
ALP BLD-CCNC: 74 U/L
ALT SERPL-CCNC: 26 U/L
ANION GAP SERPL CALC-SCNC: 15 MMOL/L
APPEARANCE: CLEAR
APTT BLD: 30.4 SEC
AST SERPL-CCNC: 34 U/L
BILIRUB SERPL-MCNC: 0.4 MG/DL
BILIRUBIN URINE: NEGATIVE
BLOOD URINE: ABNORMAL
BUN SERPL-MCNC: 11 MG/DL
CALCIUM SERPL-MCNC: 9.6 MG/DL
CHLORIDE SERPL-SCNC: 103 MMOL/L
CO2 SERPL-SCNC: 22 MMOL/L
COLOR: YELLOW
CREAT SERPL-MCNC: 0.81 MG/DL
EGFR: 71 ML/MIN/1.73M2
GLUCOSE QUALITATIVE U: NEGATIVE MG/DL
GLUCOSE SERPL-MCNC: 90 MG/DL
INR PPP: 1.03 RATIO
KETONES URINE: ABNORMAL MG/DL
LEUKOCYTE ESTERASE URINE: ABNORMAL
NITRITE URINE: NEGATIVE
PH URINE: 6
POTASSIUM SERPL-SCNC: 5 MMOL/L
PROT SERPL-MCNC: 7.6 G/DL
PROTEIN URINE: NORMAL MG/DL
PT BLD: 12.1 SEC
SODIUM SERPL-SCNC: 140 MMOL/L
SPECIFIC GRAVITY URINE: 1.02
UROBILINOGEN URINE: 0.2 MG/DL

## 2023-06-30 NOTE — REVIEW OF SYSTEMS
[Palpitations] : palpitations [Dyspnea on Exertion] : dyspnea on exertion [Memory Loss] : memory loss [Negative] : Heme/Lymph [Chest Pain] : no chest pain [Lower Ext Edema] : no lower extremity edema [FreeTextEntry3] : Right ptosis, disrupted fields of vision

## 2023-06-30 NOTE — ASSESSMENT
[Modify medications prior to procedure] : Modify medications prior to procedure [As per surgery] : as per surgery [High Risk Surgery - Intraperitoneal, Intrathoracic or Supringuinal Vascular Procedures] : High Risk Surgery - Intraperitoneal, Intrathoracic or Supringuinal Vascular Procedures - No (0) [Ischemic Heart Disease] : Ischemic Heart Disease - No (0) [Congestive Heart Failure] : Congestive Heart Failure - No (0) [Prior Cerebrovascular Accident or TIA] : Prior Cerebrovascular Accident or TIA - No (0) [Creatinine >= 2mg/dL (1 Point)] : Creatinine >= 2mg/dL - No (0) [Insulin-dependent Diabetic (1 Point)] : Insulin-dependent Diabetic - No (0) [0] : 0 , RCRI Class: I, Risk of Post-Op Cardiac Complications: 3.9%, 95% CI for Risk Estimate: 2.8% - 5.4% [FreeTextEntry7] : Hold MVI 7 days prior to procedure.

## 2023-06-30 NOTE — HISTORY OF PRESENT ILLNESS
[Coronary Artery Disease] : coronary artery disease [Implantable Device/Pacemaker] : implantable device/pacemaker [Poor (<4 METs)] : Poor (<4 METs) [Aortic Stenosis] : aortic stenosis [Formal Caregiver] : formal caregiver [Family Member] : family member [Atrial Fibrillation] : no atrial fibrillation [Recent Myocardial Infarction] : no recent myocardial infarction [Asthma] : no asthma [COPD] : no COPD [Sleep Apnea] : no sleep apnea [Smoker] : not a smoker [Self] : no previous adverse anesthesia reaction [Chronic Anticoagulation] : no chronic anticoagulation [Chronic Kidney Disease] : no chronic kidney disease [Diabetes] : no diabetes [FreeTextEntry1] : Right blepharoplasty [FreeTextEntry2] : 7/11/23 [FreeTextEntry3] : Dr. 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 \par \par Dual chamber PPM, recent interrogation with normal pacemaker function. Placed for 3rd degree HB.\par \par Recent pneumonia, 6/2-6/12 treated with levaquin.\par \par Right ptosis, causing issues with vision, now scheduled for bleph. \par \par Denies CP, SOB, orthopnea.  Occasional palpitations without assoc symptoms. Chronic [place], stable.\par  [FreeTextEntry7] : Echo 7/2022 LVSF normal, EF 55-60%, Grade I diastolic dysfunction, mild AV stenosis, mod MR, race TR/AZ, trace pericardial effusion, pericardium normal.\par \par EKG 2/2023 Atrial sensed ventricular paced rhythm

## 2023-06-30 NOTE — PHYSICAL EXAM
[No Acute Distress] : no acute distress [Normal Sclera/Conjunctiva] : normal sclera/conjunctiva [PERRL] : pupils equal round and reactive to light [Normal Outer Ear/Nose] : the outer ears and nose were normal in appearance [Normal Oropharynx] : the oropharynx was normal [No Lymphadenopathy] : no lymphadenopathy [Supple] : supple [No Respiratory Distress] : no respiratory distress  [No Accessory Muscle Use] : no accessory muscle use [Clear to Auscultation] : lungs were clear to auscultation bilaterally [Normal Rate] : normal rate  [Regular Rhythm] : with a regular rhythm [Normal S1, S2] : normal S1 and S2 [No Abdominal Bruit] : a ~M bruit was not heard ~T in the abdomen [No Edema] : there was no peripheral edema [Soft] : abdomen soft [Non Tender] : non-tender [Non-distended] : non-distended [Normal Bowel Sounds] : normal bowel sounds [Grossly Normal Strength/Tone] : grossly normal strength/tone [No Focal Deficits] : no focal deficits [Speech Grossly Normal] : speech grossly normal [Normal Mood] : the mood was normal [de-identified] : Right ptosis [de-identified] : right canal excess cerumen, unable to visualize TM.  Left TM normal [de-identified] : 2/6 murmur

## 2023-07-05 ENCOUNTER — TRANSCRIPTION ENCOUNTER (OUTPATIENT)
Age: 88
End: 2023-07-05

## 2023-07-11 ENCOUNTER — HOSPITAL ENCOUNTER (OUTPATIENT)
Dept: HOSPITAL 74 - FASU | Age: 88
Discharge: HOME | End: 2023-07-11
Attending: OPHTHALMOLOGY
Payer: COMMERCIAL

## 2023-07-11 VITALS — HEART RATE: 66 BPM | DIASTOLIC BLOOD PRESSURE: 68 MMHG | SYSTOLIC BLOOD PRESSURE: 121 MMHG

## 2023-07-11 VITALS — BODY MASS INDEX: 21.1 KG/M2

## 2023-07-11 VITALS — RESPIRATION RATE: 16 BRPM | TEMPERATURE: 97.9 F

## 2023-07-11 DIAGNOSIS — H02.042: Primary | ICD-10-CM

## 2023-07-11 PROCEDURE — 08SQ0ZZ REPOSITION RIGHT LOWER EYELID, OPEN APPROACH: ICD-10-PCS | Performed by: OPHTHALMOLOGY

## 2023-07-19 ENCOUNTER — TRANSCRIPTION ENCOUNTER (OUTPATIENT)
Age: 88
End: 2023-07-19

## 2023-07-25 ENCOUNTER — TRANSCRIPTION ENCOUNTER (OUTPATIENT)
Age: 88
End: 2023-07-25

## 2023-07-27 ENCOUNTER — APPOINTMENT (OUTPATIENT)
Dept: GERIATRICS | Facility: CLINIC | Age: 88
End: 2023-07-27
Payer: MEDICARE

## 2023-07-27 VITALS
BODY MASS INDEX: 21.63 KG/M2 | DIASTOLIC BLOOD PRESSURE: 88 MMHG | SYSTOLIC BLOOD PRESSURE: 135 MMHG | WEIGHT: 114.5 LBS | OXYGEN SATURATION: 97 % | TEMPERATURE: 98.6 F | HEART RATE: 97 BPM

## 2023-07-27 DIAGNOSIS — G31.84 MILD COGNITIVE IMPAIRMENT, SO STATED: ICD-10-CM

## 2023-07-27 PROCEDURE — 99215 OFFICE O/P EST HI 40 MIN: CPT

## 2023-07-28 ENCOUNTER — TRANSCRIPTION ENCOUNTER (OUTPATIENT)
Age: 88
End: 2023-07-28

## 2023-07-28 PROBLEM — G31.84 MCI (MILD COGNITIVE IMPAIRMENT): Status: ACTIVE | Noted: 2023-01-26

## 2023-07-28 NOTE — HISTORY OF PRESENT ILLNESS
[Smoke Detector] : smoke detector [0] : 2) Feeling down, depressed, or hopeless: Not at all (0) [FreeTextEntry1] : working with ENT Dr Sutton\par vocal cord dysfunction but now using extreme temperature drinks (hot or cold with better results)\par no choking no signs of aspiration\par lid surgery with Dr Gandhi went well\par vaginal bleeding seems to be increasing - spotting moreso per aide\par \par patient is more anxious and confused\par upset that DAISY Morales is "dictating" her care\par upset about bowel regimen\par \par anxiety notable\par \par extensive discussion with son Andrei\par her memory testing earlier this year was normal\par but there is definite decline and notable decline even from April \par he will see neurologist\par more notable R hand tremor and possible spasms to discuss \par more anxiety\par she is not aware of PSP diagnosis son prefers this\par to prevent anxiety attacks\par her cognitive function is declining\par I suspect some paranoia as well\par agree that telling her about PSP could very well set off panic attack - not wanting to do that but will focus on symptom or palliative mangement\par  [Any fall with injury in past year] : Patient reported fall with injury in the past year [Completely Dependent] : Completely dependent. [Walker] : walker [PHQ-2 Negative - No further assessment needed] : PHQ-2 Negative - No further assessment needed [[id number]] : 0

## 2023-07-28 NOTE — PHYSICAL EXAM
[Alert] : alert [Well Nourished] : well nourished [Well Developed] : well developed [Sclera] : the sclera and conjunctiva were normal [EOMI] : extraocular movements were intact [PERRL] : pupils were equal in size, round, and reactive to light [Normal Oral Mucosa] : normal oral mucosa [No Oral Pallor] : no oral pallor [No Respiratory Distress] : no respiratory distress [No Acc Muscle Use] : no accessory muscle use [Auscultation Breath Sounds / Voice Sounds] : lungs were clear to auscultation bilaterally [Respiration, Rhythm And Depth] : normal respiratory rhythm and effort [Normal S1, S2] : normal S1 and S2 [Heart Rate And Rhythm] : heart rate was normal and rhythm regular [Bowel Sounds] : normal bowel sounds [Abdomen Tenderness] : non-tender [Abdomen Soft] : soft [Cervical Lymph Nodes Enlarged Posterior Bilaterally] : posterior cervical [Cervical Lymph Nodes Enlarged Anterior Bilaterally] : anterior cervical, supraclavicular [No CVA Tenderness] : no CVA  tenderness [No Spinal Tenderness] : no spinal tenderness [Normal Color / Pigmentation] : normal skin color and pigmentation [] : no rash [Normal Turgor] : normal skin turgor [Sensation] : the sensory exam was normal to light touch and pinprick [No Focal Deficits] : no focal deficits [Motor Exam] : the motor exam was normal [Normal Affect] : the affect was normal [Normal Mood] : the mood was normal

## 2023-07-28 NOTE — ASSESSMENT
[FreeTextEntry1] : will trial increase in benzo\par will see neurology\par concern about new diagnosis PSP\par patient w history of panic in past with worsening anxiety\par diagnosis could bring on panic\par son wanting to withhold this diagnosis \par I am in agreement\par bloody vaginal discharge\par son will reach out to OBGYN as wanting symptom management\par not wanting surgery or any anesthesia as possible \par \par already high risk for falls\par several falls last month\par needs constant supervision to prevent retropulsion\par \par very anxious about any testing that may need to be done\par will not pursue at this time as she remains asymptomatic\par \par son Andrei very involved in care\par He always ensures that the information is delivered\par \par \par \par

## 2023-08-03 LAB
IRON SATN MFR SERPL: 27 %
IRON SERPL-MCNC: 97 UG/DL
TIBC SERPL-MCNC: 354 UG/DL
UIBC SERPL-MCNC: 257 UG/DL

## 2023-08-23 ENCOUNTER — APPOINTMENT (OUTPATIENT)
Dept: NEUROLOGY | Facility: CLINIC | Age: 88
End: 2023-08-23
Payer: MEDICARE

## 2023-08-23 VITALS
HEART RATE: 71 BPM | DIASTOLIC BLOOD PRESSURE: 84 MMHG | HEIGHT: 61 IN | WEIGHT: 115 LBS | SYSTOLIC BLOOD PRESSURE: 153 MMHG | BODY MASS INDEX: 21.71 KG/M2

## 2023-08-23 DIAGNOSIS — G24.9 DYSTONIA, UNSPECIFIED: ICD-10-CM

## 2023-08-23 DIAGNOSIS — Z87.898 PERSONAL HISTORY OF OTHER SPECIFIED CONDITIONS: ICD-10-CM

## 2023-08-23 DIAGNOSIS — G23.1: ICD-10-CM

## 2023-08-23 PROCEDURE — 99215 OFFICE O/P EST HI 40 MIN: CPT

## 2023-08-23 NOTE — ASSESSMENT
[FreeTextEntry1] : Patient has been given the diagnosis of PSP based on her limited upward gaze along with some parkinsonian features.  I have explained to Andrei that generally speaking most medications do not help.   Family absolutely does not want the diagnosis discussed with the patient. Discussed guardianship with Andrei. Mrs. Reynolds has very limited insight into her condition and has declined cognitively. She is unable to make solid decisions regarding her health and other life issues.  I suspect the hand "spasm" may be a dystonia. I will start her on carbidopa levodopa to see if this actually helps some of the stiffness and hand cramps.  If this does not work, we do not have too many other options but they can consider Botox. I will order a wrist brace as recommended by her OT.  I framed today's visit with the patient as just trying to help her symptoms. PSP does not respond to dopaminergic medications.   I will refer her to movement disorder specialist at [facility].  I am not fond of Clonazepam due to possibility of paradoxical reaction. May consider Quetiapine in the future.  She continues to follow with cardiology at [facility] and has an echo pending in December.  Her pacemaker is in good functioning order.  I will scan the letter sent by Dr. Andrei Reynolds into the chart.

## 2023-08-23 NOTE — CONSULT LETTER
[Dear  ___] : Dear  [unfilled], [Please see my note below.] : Please see my note below. [Courtesy Letter:] : I had the pleasure of seeing your patient, [unfilled], in my office today. [FreeTextEntry3] : Sincerely,\par  \par  Thomas Molina M.D.\par

## 2023-08-23 NOTE — PHYSICAL EXAM
[FreeTextEntry1] : Physical examination  General: No acute distress, Awake, Alert.   Mental status  Awake, alert, and oriented to person, time and place, limited attention span and concentration, Recent and remote memory NOT intact, Language intact, Fund of knowledge and insight is impaired.  Last Lawrence was 25/30.  Declined MoCA testing today. She is not sure why she is seeing me today.  Cranial Nerves  II: VFF  III, IV, VI: PERRL, decreased/limited upward gaze. V: Facial sensation is normal B/L.  VII: Facial strength is normal B/L.  VIII: Gross hearing is intact.  IX, X: Palate is midline and elevates symmetrically.  XI: Trapezius normal strength.  XII: Tongue midline without atrophy or fasciculations.   Motor exam  Muscle Strength-   BUE is normal Bilateral lower extremity strength is 4/5 proximally.  Distal strength is preserved.  Mild cogwheeling and increased tone noted in the bilateral upper extremities. Her hands digits 2 3 and 4 are curled  Reflexes  All present, normal, and symmetrical.  Plantars right: mute.  Plantars left: mute.   Coordination  unable to follow commands   Sensory  impaired vibration in feet Romberg present  Gait  reduced arm swing bilaterally unsteady without walker short steps narrow base festination   more steady with walking  hypophonia masked facial expression reduced blink rate  no resting tremor bilateral mild tremor with hands outstretched
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
## 2023-08-28 ENCOUNTER — TRANSCRIPTION ENCOUNTER (OUTPATIENT)
Age: 88
End: 2023-08-28

## 2023-09-05 ENCOUNTER — TRANSCRIPTION ENCOUNTER (OUTPATIENT)
Age: 88
End: 2023-09-05

## 2023-09-06 ENCOUNTER — NON-APPOINTMENT (OUTPATIENT)
Age: 88
End: 2023-09-06

## 2023-09-06 DIAGNOSIS — M25.541 PAIN IN JOINTS OF RIGHT HAND: ICD-10-CM

## 2023-09-06 DIAGNOSIS — M25.542 PAIN IN JOINTS OF RIGHT HAND: ICD-10-CM

## 2023-09-06 RX ORDER — NAPROXEN 250 MG/1
250 TABLET ORAL TWICE DAILY
Qty: 30 | Refills: 3 | Status: ACTIVE | COMMUNITY
Start: 2023-09-06 | End: 1900-01-01

## 2023-09-07 ENCOUNTER — TRANSCRIPTION ENCOUNTER (OUTPATIENT)
Age: 88
End: 2023-09-07

## 2023-09-12 ENCOUNTER — RX RENEWAL (OUTPATIENT)
Age: 88
End: 2023-09-12

## 2023-10-02 ENCOUNTER — TRANSCRIPTION ENCOUNTER (OUTPATIENT)
Age: 88
End: 2023-10-02

## 2023-10-04 ENCOUNTER — TRANSCRIPTION ENCOUNTER (OUTPATIENT)
Age: 88
End: 2023-10-04

## 2023-10-16 ENCOUNTER — TRANSCRIPTION ENCOUNTER (OUTPATIENT)
Age: 88
End: 2023-10-16

## 2023-10-20 ENCOUNTER — TRANSCRIPTION ENCOUNTER (OUTPATIENT)
Age: 88
End: 2023-10-20

## 2023-11-01 ENCOUNTER — APPOINTMENT (OUTPATIENT)
Dept: GERIATRICS | Facility: CLINIC | Age: 88
End: 2023-11-01
Payer: MEDICARE

## 2023-11-01 ENCOUNTER — LABORATORY RESULT (OUTPATIENT)
Age: 88
End: 2023-11-01

## 2023-11-01 VITALS
WEIGHT: 118.2 LBS | HEART RATE: 90 BPM | TEMPERATURE: 98.2 F | OXYGEN SATURATION: 96 % | BODY MASS INDEX: 22.33 KG/M2 | DIASTOLIC BLOOD PRESSURE: 78 MMHG | SYSTOLIC BLOOD PRESSURE: 138 MMHG

## 2023-11-01 PROCEDURE — 99496 TRANSJ CARE MGMT HIGH F2F 7D: CPT

## 2023-11-01 RX ORDER — CARBIDOPA AND LEVODOPA 25; 100 MG/1; MG/1
25-100 TABLET ORAL
Qty: 90 | Refills: 2 | Status: COMPLETED | COMMUNITY
Start: 2023-08-23 | End: 2023-11-01

## 2023-11-06 LAB
APPEARANCE: ABNORMAL
BILIRUBIN URINE: NEGATIVE
BLOOD URINE: ABNORMAL
COLOR: YELLOW
GLUCOSE QUALITATIVE U: NEGATIVE MG/DL
KETONES URINE: NEGATIVE MG/DL
LEUKOCYTE ESTERASE URINE: ABNORMAL
NITRITE URINE: NEGATIVE
PH URINE: 5.5
PROTEIN URINE: 30 MG/DL
SPECIFIC GRAVITY URINE: 1.03
UROBILINOGEN URINE: 0.2 MG/DL

## 2023-11-07 ENCOUNTER — APPOINTMENT (OUTPATIENT)
Dept: GERIATRICS | Facility: CLINIC | Age: 88
End: 2023-11-07

## 2023-11-14 ENCOUNTER — TRANSCRIPTION ENCOUNTER (OUTPATIENT)
Age: 88
End: 2023-11-14

## 2023-11-15 ENCOUNTER — TRANSCRIPTION ENCOUNTER (OUTPATIENT)
Age: 88
End: 2023-11-15

## 2023-11-29 ENCOUNTER — RX RENEWAL (OUTPATIENT)
Age: 88
End: 2023-11-29

## 2023-11-29 ENCOUNTER — APPOINTMENT (OUTPATIENT)
Dept: NEUROLOGY | Facility: CLINIC | Age: 88
End: 2023-11-29

## 2023-12-01 ENCOUNTER — APPOINTMENT (OUTPATIENT)
Dept: GERIATRICS | Facility: CLINIC | Age: 88
End: 2023-12-01
Payer: MEDICARE

## 2023-12-01 PROCEDURE — 99214 OFFICE O/P EST MOD 30 MIN: CPT | Mod: 95

## 2023-12-08 ENCOUNTER — NON-APPOINTMENT (OUTPATIENT)
Age: 88
End: 2023-12-08

## 2023-12-26 ENCOUNTER — APPOINTMENT (OUTPATIENT)
Dept: GERIATRICS | Facility: CLINIC | Age: 88
End: 2023-12-26

## 2023-12-27 ENCOUNTER — TRANSCRIPTION ENCOUNTER (OUTPATIENT)
Age: 88
End: 2023-12-27

## 2023-12-29 ENCOUNTER — NON-APPOINTMENT (OUTPATIENT)
Age: 88
End: 2023-12-29

## 2023-12-30 DIAGNOSIS — U07.1 COVID-19: ICD-10-CM

## 2023-12-30 RX ORDER — AMANTADINE HYDROCHLORIDE 100 1/1
100 TABLET ORAL
Refills: 0 | Status: COMPLETED | COMMUNITY
Start: 2023-12-06 | End: 2023-12-30

## 2023-12-30 RX ORDER — DILTIAZEM HYDROCHLORIDE 30 MG/1
30 TABLET ORAL
Qty: 180 | Refills: 3 | Status: COMPLETED | COMMUNITY
Start: 2023-09-01 | End: 2023-12-30

## 2024-01-02 ENCOUNTER — TRANSCRIPTION ENCOUNTER (OUTPATIENT)
Age: 89
End: 2024-01-02

## 2024-01-12 ENCOUNTER — NON-APPOINTMENT (OUTPATIENT)
Age: 89
End: 2024-01-12

## 2024-02-09 NOTE — ASSESSMENT
[FreeTextEntry1] : advancing disease with more physical and emotional debility severe anxiety and delusions in addition to current regimen will add small daytime dose of seroquel in future might consider zyprexa but to see if delusions or anxiety might finally break or at least slightly diminish to improve quality of life refer to palliative care fall risk increased with more debility weakness 2 person assist now on keppra with suspected seizures will require more DME to prevent falls will order semielectic bed  patient with urinary incontinence and more debility will order supplies including chucks, pullups, liners and gloves for patient

## 2024-02-09 NOTE — HISTORY OF PRESENT ILLNESS
[FreeTextEntry1] : Visit with both son Andrei and sister  Patient  is now on keppra 250mg po BID after likelihood of seizures Big concerns about confusion, worsening anxiety and delusions We discussed advanced directives and mother stated that she did not want to have prolonged care if any neurologic condition such as PSP Of note, family prefers that she is NOT AWARE of PSP diagnosis She is now requiring help 24hours a day She needs constant redirection but is also more weak She has gait instability and is weak She is high fall risk She is more bed and chair dependent. Family feels that she has had substantial decline over past 2 years Now there are even concerns about safety in her own home and if she can safely stay there as she requires 1-2 person assist for most ADLs Concern is growing delusions Family is open to seeing pallaitive care for consultation Discussed that antipsychotics are not generally prescribed in this case But feel that if they could possible improve the delusions and anxiety they might be able to improve her quality of life   she is not aware of PSP diagnosis son prefers this to prevent anxiety attacks her cognitive function is declining I suspect some paranoia as well agree that telling her about PSP could very well set off panic attack - not wanting to do that but will focus on symptom or palliative management  [[id number]] : 2

## 2024-02-13 ENCOUNTER — NON-APPOINTMENT (OUTPATIENT)
Age: 89
End: 2024-02-13

## 2024-02-28 ENCOUNTER — APPOINTMENT (OUTPATIENT)
Dept: NEUROLOGY | Facility: CLINIC | Age: 89
End: 2024-02-28
Payer: MEDICARE

## 2024-02-28 VITALS
DIASTOLIC BLOOD PRESSURE: 84 MMHG | HEIGHT: 61 IN | SYSTOLIC BLOOD PRESSURE: 133 MMHG | OXYGEN SATURATION: 95 % | HEART RATE: 97 BPM

## 2024-02-28 DIAGNOSIS — D32.9 BENIGN NEOPLASM OF MENINGES, UNSPECIFIED: ICD-10-CM

## 2024-02-28 PROCEDURE — 99215 OFFICE O/P EST HI 40 MIN: CPT

## 2024-02-28 RX ORDER — LEVETIRACETAM 250 MG/1
250 TABLET, FILM COATED ORAL DAILY
Qty: 150 | Refills: 5 | Status: ACTIVE | COMMUNITY
Start: 2024-02-28 | End: 1900-01-01

## 2024-02-29 PROBLEM — D32.9 MENINGIOMA: Status: ACTIVE | Noted: 2023-03-28

## 2024-02-29 NOTE — ASSESSMENT
[FreeTextEntry1] : Patient has been given the diagnosis of PSP based on her limited upward gaze along with some parkinsonian features.  I have explained to Andrei that generally speaking most medications do not help.   Family absolutely does not want the diagnosis discussed with the patient. Discussed guardianship with Andrei. Mrs. Reynolds has very limited insight into her condition and has declined cognitively. She is unable to make solid decisions regarding her health and other life issues.  I will increase her Keppra to 1250 mg.  We discussed adding a second agent.  Andrei wants to keep things at a minimum.  If she continues to have increasing episodes, I will order lamotrigine.  He understands that she is at risk for status epilepticus which can cause death.  Increase quetiapine 25 mg half tablet twice daily. Discussed risk of arrhythmia. Andrei is aware and wants her to be comfortable.  Continue using wrist brace.  I framed today's visit with the patient as just trying to help her symptoms. PSP does not respond to dopaminergic medications.

## 2024-02-29 NOTE — PHYSICAL EXAM
[FreeTextEntry1] : Physical examination  General: No acute distress, Sleepy but attempts to stay awake  Mental status  Awake, alert, and oriented to person NOT time and place, limited attention span and concentration, Recent and remote memory NOT intact, Language NOT intact, Fund of knowledge and insight is impaired.  She is not sure why she is seeing me today.  Hypophonic voice  Cranial Nerves  II: VFF  III, IV, VI: PERRL, decreased/limited upward gaze. V: Facial sensation is normal B/L.  VII: Facial strength is normal B/L.  VIII: Gross hearing is intact.  IX, X: Palate is midline and elevates symmetrically.  XI: Trapezius normal strength.  XII: Tongue midline without atrophy or fasciculations.   Motor exam  Muscle Strength- Moving all 4 extremities but is not following directions Cogwheeling and increased tone noted in the bilateral upper extremities. Her hands digits 2 3 and 4 are curled  Coordination  unable to follow commands   Gait  Not tested wc bound    hypophonia masked facial expression reduced blink rate  no resting tremor bilateral mild tremor with hands outstretched

## 2024-02-29 NOTE — CONSULT LETTER
[Dear  ___] : Dear  [unfilled], [Courtesy Letter:] : I had the pleasure of seeing your patient, [unfilled], in my office today. [Please see my note below.] : Please see my note below. [FreeTextEntry3] : Sincerely,\par  \par  Thomas Molina M.D.\par
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
## 2024-03-21 ENCOUNTER — TRANSCRIPTION ENCOUNTER (OUTPATIENT)
Age: 89
End: 2024-03-21

## 2024-03-22 ENCOUNTER — TRANSCRIPTION ENCOUNTER (OUTPATIENT)
Age: 89
End: 2024-03-22

## 2024-03-25 ENCOUNTER — TRANSCRIPTION ENCOUNTER (OUTPATIENT)
Age: 89
End: 2024-03-25

## 2024-03-26 ENCOUNTER — TRANSCRIPTION ENCOUNTER (OUTPATIENT)
Age: 89
End: 2024-03-26

## 2024-03-28 ENCOUNTER — TRANSCRIPTION ENCOUNTER (OUTPATIENT)
Age: 89
End: 2024-03-28

## 2024-04-02 ENCOUNTER — TRANSCRIPTION ENCOUNTER (OUTPATIENT)
Age: 89
End: 2024-04-02

## 2024-04-03 ENCOUNTER — APPOINTMENT (OUTPATIENT)
Dept: GERIATRICS | Facility: CLINIC | Age: 89
End: 2024-04-03
Payer: MEDICARE

## 2024-04-03 DIAGNOSIS — R29.6 REPEATED FALLS: ICD-10-CM

## 2024-04-03 DIAGNOSIS — G40.209 LOCALIZATION-RELATED (FOCAL) (PARTIAL) SYMPTOMATIC EPILEPSY AND EPILEPTIC SYNDROMES WITH COMPLEX PARTIAL SEIZURES, NOT INTRACTABLE, W/OUT STATUS EPILEPTICUS: ICD-10-CM

## 2024-04-03 PROCEDURE — G2211 COMPLEX E/M VISIT ADD ON: CPT

## 2024-04-03 PROCEDURE — 99214 OFFICE O/P EST MOD 30 MIN: CPT

## 2024-04-10 PROBLEM — G40.209 LOCALIZATION-RELATED (FOCAL) (PARTIAL) SYMPTOMATIC EPILEPSY AND EPILEPTIC SYNDROMES WITH COMPLEX PARTIAL SEIZURES, NOT INTRACTABLE, WITHOUT STATUS EPILEPTICUS: Status: ACTIVE | Noted: 2024-02-29

## 2024-04-10 PROBLEM — R29.6 FALLS FREQUENTLY: Status: ACTIVE | Noted: 2023-03-15

## 2024-04-10 NOTE — HISTORY OF PRESENT ILLNESS
[FreeTextEntry1] : Visit with both son Andrei and sister  Patient  is now on more agents for seizure control Big concerns about confusion, worsening anxiety and delusions  Of note, family prefers that she is NOT AWARE of PSP diagnosis She is now requiring help 24hours a day She needs constant redirection but is also more weak She has gait instability and is weak She is high fall risk She is more bed and chair dependent. Family feels that she has had substantial decline over past 2 years Now there are even concerns about safety in her own home and if she can safely stay there as she requires 1-2 person assist for most ADLs Concern is growing delusions    [Any fall with injury in past year] : Patient reported fall with injury in the past year [Completely Dependent] : Completely dependent. [Walker] : walker [Wheelchair] : wheelchair [Smoke Detector] : smoke detector [Carbon Monoxide Detector] : carbon monoxide detector [0] : 1) Little interest or pleasure doing things: Not at all (0) [1] : 2) Feeling down, depressed, or hopeless for several days (1) [PHQ-2 Negative - No further assessment needed] : PHQ-2 Negative - No further assessment needed [[id number]] : 1

## 2024-04-10 NOTE — ASSESSMENT
[FreeTextEntry1] : high risk for falls discussed that Seroquel tends to be more sedating can trial olanzapine but if any negative side effects son will alert me we can certainly uptitrate this but will start with low dose next step will be to reevaluate every single medication in context of palliation of symptoms or improving her quality of life  also will need to discuss advanced directives ie MOLST with son and sister in detail in near future

## 2024-04-10 NOTE — REASON FOR VISIT
[Home] : at home, [unfilled] , at the time of the visit. [Medical Office: ([facility])___] : at the medical office located in  [Other:____] : [unfilled] [This encounter was initiated by telehealth (audio with video) and converted to telephone (audio only) due to technical difficulties.] : This encounter was initiated by telehealth (audio with video) and converted to telephone (audio only) due to technical difficulties. [FreeTextEntry2] : patient's son Andrei and sister on call  [Follow-Up] : a follow-up visit [Family Member] : family member

## 2024-04-11 ENCOUNTER — NON-APPOINTMENT (OUTPATIENT)
Age: 89
End: 2024-04-11

## 2024-04-15 ENCOUNTER — TRANSCRIPTION ENCOUNTER (OUTPATIENT)
Age: 89
End: 2024-04-15

## 2024-04-16 ENCOUNTER — TRANSCRIPTION ENCOUNTER (OUTPATIENT)
Age: 89
End: 2024-04-16

## 2024-04-17 ENCOUNTER — TRANSCRIPTION ENCOUNTER (OUTPATIENT)
Age: 89
End: 2024-04-17

## 2024-04-18 ENCOUNTER — TRANSCRIPTION ENCOUNTER (OUTPATIENT)
Age: 89
End: 2024-04-18

## 2024-04-23 ENCOUNTER — TRANSCRIPTION ENCOUNTER (OUTPATIENT)
Age: 89
End: 2024-04-23

## 2024-04-25 ENCOUNTER — TRANSCRIPTION ENCOUNTER (OUTPATIENT)
Age: 89
End: 2024-04-25

## 2024-04-29 ENCOUNTER — APPOINTMENT (OUTPATIENT)
Dept: GERIATRICS | Facility: CLINIC | Age: 89
End: 2024-04-29
Payer: MEDICARE

## 2024-04-29 DIAGNOSIS — M81.0 AGE-RELATED OSTEOPOROSIS W/OUT CURRENT PATHOLOGICAL FRACTURE: ICD-10-CM

## 2024-04-29 PROCEDURE — G2211 COMPLEX E/M VISIT ADD ON: CPT

## 2024-04-29 PROCEDURE — 99215 OFFICE O/P EST HI 40 MIN: CPT

## 2024-04-29 RX ORDER — SERTRALINE HYDROCHLORIDE 100 MG/1
100 TABLET, FILM COATED ORAL
Qty: 90 | Refills: 3 | Status: ACTIVE | COMMUNITY
Start: 2022-02-14

## 2024-04-29 RX ORDER — LEVETIRACETAM 1000 MG/1
1000 TABLET, FILM COATED ORAL DAILY
Qty: 30 | Refills: 2 | Status: COMPLETED | COMMUNITY
Start: 2023-11-14 | End: 2024-04-29

## 2024-04-29 RX ORDER — LAMOTRIGINE 25 MG/1
25 TABLET ORAL
Qty: 60 | Refills: 1 | Status: COMPLETED | COMMUNITY
Start: 2024-03-25 | End: 2024-04-29

## 2024-05-06 PROBLEM — M81.0 AGE RELATED OSTEOPOROSIS: Status: ACTIVE | Noted: 2022-12-29

## 2024-05-09 RX ORDER — DOCOSAHEXAENOIC ACID 300 MG
50 MCG CAPSULE ORAL
Refills: 0 | Status: ACTIVE | COMMUNITY

## 2024-05-09 RX ORDER — TRANEXAMIC ACID 650 MG/1
650 TABLET ORAL 3 TIMES DAILY
Qty: 90 | Refills: 1 | Status: ACTIVE | COMMUNITY
Start: 2024-02-22

## 2024-05-09 RX ORDER — PSYLLIUM HUSK 0.4 G
0.36 CAPSULE ORAL TWICE DAILY
Refills: 0 | Status: ACTIVE | COMMUNITY
Start: 2024-05-09

## 2024-05-09 RX ORDER — NORETHINDRONE ACETATE 5 MG/1
5 TABLET ORAL
Qty: 120 | Refills: 3 | Status: ACTIVE | COMMUNITY
Start: 2023-07-31

## 2024-05-09 RX ORDER — ACETAMINOPHEN 500 MG/1
500 TABLET, COATED ORAL EVERY 6 HOURS
Refills: 0 | Status: ACTIVE | COMMUNITY
Start: 2024-05-09

## 2024-05-09 NOTE — REASON FOR VISIT
[Acute] : an acute visit [Home] : at home, [unfilled] , at the time of the visit. [Medical Office: ([facility])___] : at the medical office located in  [Family Member] : family member [Formal Caregiver] : formal caregiver [FreeTextEntry2] : sister

## 2024-05-09 NOTE — ASSESSMENT
[FreeTextEntry1] : advancing disease with more physical and emotional debility severe anxiety and delusions tolerating zyprexa filled out forms for 24 hour care eventually plan is to taper sertraline as she may not need such a high dose in addition to zyprexa also could establish with palliative care  fall risk increased with more debility weakness 2 person assist now on keppra with suspected seizures  will order semielectic bed  patient with urinary incontinence and more debility patient using supplies including chucks, pullups, liners and gloves for patient

## 2024-05-09 NOTE — HISTORY OF PRESENT ILLNESS
[Any fall with injury in past year] : Patient reported fall with injury in the past year [Completely Dependent] : Completely dependent. [Walker] : walker [Wheelchair] : wheelchair [Smoke Detector] : smoke detector [1] : 1) Little interest or pleasure doing things for several days (1) [0] : 2) Feeling down, depressed, or hopeless: Not at all (0) [PHQ-2 Negative - No further assessment needed] : PHQ-2 Negative - No further assessment needed [Moderate] : Stage: Moderate [Worse] : Status: Worse [Memory Lapses Or Loss] : worsened memory impairment [Fixed Beliefs Contradicted By Reality (Delusions)] : worsened delusions [FreeTextEntry1] : Visit with both sister, HHA and patient Mrs Reynolds Patient is now on keppra 250mg po BID after likelihood of seizures Big concerns about confusion, worsening anxiety and delusions We discussed advanced directives and mother stated that she did not want to have prolonged care if any neurologic condition such as PSP Of note, family prefers that she is NOT AWARE of PSP diagnosis She is now requiring help 24hours a day She needs constant redirection but is also more weak She has gait instability and is weak She is high fall risk She is more bed and chair dependent. Family feels that she has had substantial decline over past 2 years Now there are even concerns about safety in her own home and if she can safely stay there as she requires 1-2 person assist for most ADLs delusions were more prominent last visit, now on antipsychotic  update:  Tolerating zyprexa, less sleepy  she is not aware of PSP diagnosis son prefers this to prevent anxiety attacks her cognitive function is declining I suspect some paranoia as well agree that telling her about PSP could very well set off panic attack - not wanting to do that but will focus on symptom or palliative management  [[id number]] : 1

## 2024-05-22 ENCOUNTER — APPOINTMENT (OUTPATIENT)
Dept: GERIATRICS | Facility: CLINIC | Age: 89
End: 2024-05-22
Payer: MEDICARE

## 2024-05-22 DIAGNOSIS — R41.89 OTHER SYMPTOMS AND SIGNS INVOLVING COGNITIVE FUNCTIONS AND AWARENESS: ICD-10-CM

## 2024-05-22 DIAGNOSIS — G40.119 LOCALIZATION-RELATED (FOCAL) (PARTIAL) SYMPTOMATIC EPILEPSY AND EPILEPTIC SYNDROMES WITH SIMPLE PARTIAL SEIZURES, INTRACTABLE, W/OUT STATUS EPILEPTICUS: ICD-10-CM

## 2024-05-22 DIAGNOSIS — N39.0 URINARY TRACT INFECTION, SITE NOT SPECIFIED: ICD-10-CM

## 2024-05-22 DIAGNOSIS — G23.1: ICD-10-CM

## 2024-05-22 DIAGNOSIS — I10 ESSENTIAL (PRIMARY) HYPERTENSION: ICD-10-CM

## 2024-05-22 DIAGNOSIS — F29 UNSPECIFIED PSYCHOSIS NOT DUE TO A SUBSTANCE OR KNOWN PHYSIOLOGICAL CONDITION: ICD-10-CM

## 2024-05-22 DIAGNOSIS — F41.9 ANXIETY DISORDER, UNSPECIFIED: ICD-10-CM

## 2024-05-22 DIAGNOSIS — R32 UNSPECIFIED URINARY INCONTINENCE: ICD-10-CM

## 2024-05-22 DIAGNOSIS — I25.10 ATHEROSCLEROTIC HEART DISEASE OF NATIVE CORONARY ARTERY W/OUT ANGINA PECTORIS: ICD-10-CM

## 2024-05-22 PROCEDURE — G2211 COMPLEX E/M VISIT ADD ON: CPT | Mod: NC

## 2024-05-22 PROCEDURE — 99443: CPT | Mod: 93

## 2024-05-22 RX ORDER — DOXYCYCLINE 100 MG/1
100 TABLET, FILM COATED ORAL
Qty: 14 | Refills: 0 | Status: ACTIVE | COMMUNITY
Start: 2024-05-22 | End: 1900-01-01

## 2024-05-22 RX ORDER — OLANZAPINE 2.5 MG/1
2.5 TABLET, FILM COATED ORAL TWICE DAILY
Qty: 60 | Refills: 0 | Status: COMPLETED | COMMUNITY
Start: 2024-04-03 | End: 2024-05-22

## 2024-05-22 RX ORDER — QUETIAPINE FUMARATE 25 MG/1
25 TABLET ORAL TWICE DAILY
Qty: 90 | Refills: 1 | Status: ACTIVE | COMMUNITY
Start: 2023-12-01 | End: 1900-01-01

## 2024-05-28 ENCOUNTER — TRANSCRIPTION ENCOUNTER (OUTPATIENT)
Age: 89
End: 2024-05-28

## 2024-05-29 PROBLEM — F41.9 ANXIETY: Status: ACTIVE | Noted: 2022-01-21

## 2024-05-29 PROBLEM — G40.119: Status: ACTIVE | Noted: 2023-11-14

## 2024-05-29 PROBLEM — F29 PSYCHOSIS: Status: ACTIVE | Noted: 2023-12-01

## 2024-05-29 PROBLEM — G23.1 PROGRESSIVE SUPRANUCLEAR PALSY: Status: ACTIVE | Noted: 2023-08-23

## 2024-05-29 PROBLEM — I10 BENIGN ESSENTIAL HTN: Status: ACTIVE | Noted: 2022-09-07

## 2024-05-29 PROBLEM — N39.0 ACUTE LOWER UTI: Status: ACTIVE | Noted: 2022-03-10

## 2024-05-29 PROBLEM — I25.10 CAD (CORONARY ARTERY DISEASE): Status: ACTIVE | Noted: 2022-01-21

## 2024-05-29 RX ORDER — CLONAZEPAM 0.5 MG/1
0.5 TABLET ORAL
Qty: 90 | Refills: 0 | Status: ACTIVE | COMMUNITY
Start: 2023-04-03 | End: 1900-01-01

## 2024-06-27 ENCOUNTER — TRANSCRIPTION ENCOUNTER (OUTPATIENT)
Age: 89
End: 2024-06-27

## 2024-06-27 PROBLEM — R41.89 COGNITIVE IMPAIRMENT: Status: ACTIVE | Noted: 2023-03-15

## 2024-06-27 PROBLEM — R32 URINARY INCONTINENCE IN FEMALE: Status: ACTIVE | Noted: 2024-02-09

## 2024-06-27 NOTE — ASSESSMENT
[FreeTextEntry1] : discussed that they should be in contact with neurology but given that goals appear more palliative in nature goal remains to avoid hospitaliation will treat as suspected UTI given benefits outweighing risks (also going into a long holiday weekend) will dc zyprexa and resume seroquel as sedating drug might be more beneficial especially  if will have more seizures  incontinence advancing using pullups #120 size medium using disposable chux #150 using liners #126 using gloves size Medium - 1 box washable underpads (bed) #2  prognosis guarded son Andrei very involved in care

## 2024-06-27 NOTE — REASON FOR VISIT
[Home] : at home, [unfilled] , at the time of the visit. [Medical Office: ([facility])___] : at the medical office located in  [Acute] : an acute visit [Family Member] : family member [FreeTextEntry3] : rex Reynolds

## 2024-06-27 NOTE — HISTORY OF PRESENT ILLNESS
[No falls in past year] : Patient reported no falls in the past year [Completely Dependent] : Completely dependent. [Wheelchair] : wheelchair [1] : 1) Little interest or pleasure doing things for several days (1) [0] : 2) Feeling down, depressed, or hopeless: Not at all (0) [PHQ-2 Negative - No further assessment needed] : PHQ-2 Negative - No further assessment needed [FreeTextEntry1] : Visit with primarily son Andrei Reynolds Recent seizure like activity No changes have been made to medications and they did not want to consider ER or make a neurology appointment Did give additional dose of keppra discussed possibility of UTI causing seizure -   Big concerns about confusion, worsening anxiety and delusions We discussed advanced directives and mother stated that she did not want to have prolonged care if any neurologic condition such as PSP Of note, family prefers that she is NOT AWARE of PSP diagnosis She is now requiring help 24hours a day She needs constant redirection but is also more weak She has gait instability and is weak She is high fall risk She is more bed and chair dependent. Family feels that she has had substantial decline over past 2 years Now there are even concerns about safety in her own home and if she can safely stay there as she requires 1-2 person assist for most ADLs delusions were more prominent last visit, now on antipsychotic  zyprexa might actually be TOO activating especially during the seizure episode, wanting to consider reversing to seroquel again  she is not aware of PSP diagnosis son prefers this to prevent anxiety attacks her cognitive function is declining I suspect some paranoia as well agree that telling her about PSP could very well set off panic attack - not wanting to do that but will focus on symptom or palliative management  [DHR2Unvhx] : 1

## 2024-06-28 ENCOUNTER — TRANSCRIPTION ENCOUNTER (OUTPATIENT)
Age: 89
End: 2024-06-28

## 2024-08-19 ENCOUNTER — TRANSCRIPTION ENCOUNTER (OUTPATIENT)
Age: 89
End: 2024-08-19

## 2024-08-20 ENCOUNTER — TRANSCRIPTION ENCOUNTER (OUTPATIENT)
Age: 89
End: 2024-08-20

## 2024-09-06 ENCOUNTER — TRANSCRIPTION ENCOUNTER (OUTPATIENT)
Age: 89
End: 2024-09-06

## 2024-09-10 ENCOUNTER — TRANSCRIPTION ENCOUNTER (OUTPATIENT)
Age: 89
End: 2024-09-10

## 2024-09-10 RX ORDER — NITROFURANTOIN (MONOHYDRATE/MACROCRYSTALS) 25; 75 MG/1; MG/1
100 CAPSULE ORAL
Qty: 14 | Refills: 1 | Status: ACTIVE | COMMUNITY
Start: 2024-09-04 | End: 1900-01-01

## 2024-09-16 ENCOUNTER — TRANSCRIPTION ENCOUNTER (OUTPATIENT)
Age: 89
End: 2024-09-16

## 2024-09-16 ENCOUNTER — RX RENEWAL (OUTPATIENT)
Age: 89
End: 2024-09-16

## 2024-09-17 ENCOUNTER — TRANSCRIPTION ENCOUNTER (OUTPATIENT)
Age: 89
End: 2024-09-17

## 2024-09-23 ENCOUNTER — TRANSCRIPTION ENCOUNTER (OUTPATIENT)
Age: 89
End: 2024-09-23

## 2024-10-08 ENCOUNTER — RX RENEWAL (OUTPATIENT)
Age: 89
End: 2024-10-08

## 2024-10-21 ENCOUNTER — TRANSCRIPTION ENCOUNTER (OUTPATIENT)
Age: 89
End: 2024-10-21

## 2024-10-22 ENCOUNTER — APPOINTMENT (OUTPATIENT)
Dept: GERIATRICS | Facility: CLINIC | Age: 89
End: 2024-10-22
Payer: MEDICARE

## 2024-10-22 DIAGNOSIS — F41.9 ANXIETY DISORDER, UNSPECIFIED: ICD-10-CM

## 2024-10-22 DIAGNOSIS — R93.89 ABNORMAL FINDINGS ON DIAGNOSTIC IMAGING OF OTHER SPECIFIED BODY STRUCTURES: ICD-10-CM

## 2024-10-22 DIAGNOSIS — G40.209 LOCALIZATION-RELATED (FOCAL) (PARTIAL) SYMPTOMATIC EPILEPSY AND EPILEPTIC SYNDROMES WITH COMPLEX PARTIAL SEIZURES, NOT INTRACTABLE, W/OUT STATUS EPILEPTICUS: ICD-10-CM

## 2024-10-22 DIAGNOSIS — F02.80 ALZHEIMER'S DISEASE, UNSPECIFIED: ICD-10-CM

## 2024-10-22 DIAGNOSIS — G30.9 ALZHEIMER'S DISEASE, UNSPECIFIED: ICD-10-CM

## 2024-10-22 DIAGNOSIS — N95.0 POSTMENOPAUSAL BLEEDING: ICD-10-CM

## 2024-10-22 DIAGNOSIS — G23.1: ICD-10-CM

## 2024-10-22 PROCEDURE — G2211 COMPLEX E/M VISIT ADD ON: CPT

## 2024-10-22 PROCEDURE — 99215 OFFICE O/P EST HI 40 MIN: CPT

## 2024-10-23 ENCOUNTER — APPOINTMENT (OUTPATIENT)
Dept: GERIATRICS | Facility: CLINIC | Age: 89
End: 2024-10-23

## 2024-10-24 ENCOUNTER — TRANSCRIPTION ENCOUNTER (OUTPATIENT)
Age: 89
End: 2024-10-24

## 2024-12-25 PROBLEM — F10.90 ALCOHOL USE: Status: ACTIVE | Noted: 2023-03-15

## 2025-01-06 DIAGNOSIS — Z51.5 ENCOUNTER FOR PALLIATIVE CARE: ICD-10-CM

## 2025-01-06 RX ORDER — FENTANYL 12 UG/H
12 PATCH, EXTENDED RELEASE TRANSDERMAL
Qty: 5 | Refills: 0 | Status: ACTIVE | COMMUNITY
Start: 2025-01-06 | End: 1900-01-01

## 2025-01-09 ENCOUNTER — TRANSCRIPTION ENCOUNTER (OUTPATIENT)
Age: 89
End: 2025-01-09

## 2025-01-10 ENCOUNTER — TRANSCRIPTION ENCOUNTER (OUTPATIENT)
Age: 89
End: 2025-01-10

## 2025-01-13 ENCOUNTER — TRANSCRIPTION ENCOUNTER (OUTPATIENT)
Age: 89
End: 2025-01-13

## 2025-02-03 ENCOUNTER — TRANSCRIPTION ENCOUNTER (OUTPATIENT)
Age: 89
End: 2025-02-03